# Patient Record
Sex: FEMALE | Race: WHITE | NOT HISPANIC OR LATINO | Employment: UNEMPLOYED | URBAN - METROPOLITAN AREA
[De-identification: names, ages, dates, MRNs, and addresses within clinical notes are randomized per-mention and may not be internally consistent; named-entity substitution may affect disease eponyms.]

---

## 2021-08-25 ENCOUNTER — OFFICE VISIT (OUTPATIENT)
Dept: FAMILY MEDICINE CLINIC | Facility: CLINIC | Age: 21
End: 2021-08-25

## 2021-08-25 ENCOUNTER — LAB (OUTPATIENT)
Dept: LAB | Facility: HOSPITAL | Age: 21
End: 2021-08-25

## 2021-08-25 VITALS
RESPIRATION RATE: 18 BRPM | BODY MASS INDEX: 33.17 KG/M2 | SYSTOLIC BLOOD PRESSURE: 121 MMHG | HEART RATE: 79 BPM | OXYGEN SATURATION: 97 % | DIASTOLIC BLOOD PRESSURE: 69 MMHG | HEIGHT: 63 IN | WEIGHT: 187.2 LBS

## 2021-08-25 DIAGNOSIS — R19.8 ALTERNATING CONSTIPATION AND DIARRHEA: ICD-10-CM

## 2021-08-25 DIAGNOSIS — E66.09 CLASS 1 OBESITY DUE TO EXCESS CALORIES WITHOUT SERIOUS COMORBIDITY WITH BODY MASS INDEX (BMI) OF 33.0 TO 33.9 IN ADULT: ICD-10-CM

## 2021-08-25 DIAGNOSIS — E03.9 HYPOTHYROIDISM, UNSPECIFIED TYPE: Primary | ICD-10-CM

## 2021-08-25 DIAGNOSIS — R53.83 FATIGUE, UNSPECIFIED TYPE: ICD-10-CM

## 2021-08-25 PROBLEM — E66.811 CLASS 1 OBESITY DUE TO EXCESS CALORIES WITHOUT SERIOUS COMORBIDITY WITH BODY MASS INDEX (BMI) OF 33.0 TO 33.9 IN ADULT: Status: ACTIVE | Noted: 2021-08-25

## 2021-08-25 LAB
ALBUMIN SERPL-MCNC: 4.2 G/DL (ref 3.5–5.2)
ALBUMIN/GLOB SERPL: 1.7 G/DL
ALP SERPL-CCNC: 100 U/L (ref 39–117)
ALT SERPL W P-5'-P-CCNC: 31 U/L (ref 1–33)
ANION GAP SERPL CALCULATED.3IONS-SCNC: 9.9 MMOL/L (ref 5–15)
AST SERPL-CCNC: 27 U/L (ref 1–32)
BASOPHILS # BLD AUTO: 0.05 10*3/MM3 (ref 0–0.2)
BASOPHILS NFR BLD AUTO: 0.5 % (ref 0–1.5)
BILIRUB SERPL-MCNC: 0.2 MG/DL (ref 0–1.2)
BUN SERPL-MCNC: 17 MG/DL (ref 6–20)
BUN/CREAT SERPL: 25 (ref 7–25)
CALCIUM SPEC-SCNC: 9.7 MG/DL (ref 8.6–10.5)
CHLORIDE SERPL-SCNC: 104 MMOL/L (ref 98–107)
CO2 SERPL-SCNC: 24.1 MMOL/L (ref 22–29)
CREAT SERPL-MCNC: 0.68 MG/DL (ref 0.57–1)
DEPRECATED RDW RBC AUTO: 39.1 FL (ref 37–54)
EOSINOPHIL # BLD AUTO: 0.18 10*3/MM3 (ref 0–0.4)
EOSINOPHIL NFR BLD AUTO: 1.7 % (ref 0.3–6.2)
ERYTHROCYTE [DISTWIDTH] IN BLOOD BY AUTOMATED COUNT: 12 % (ref 12.3–15.4)
FERRITIN SERPL-MCNC: 31.7 NG/ML (ref 13–150)
FOLATE SERPL-MCNC: 5.39 NG/ML (ref 4.78–24.2)
GFR SERPL CREATININE-BSD FRML MDRD: 107 ML/MIN/1.73
GLOBULIN UR ELPH-MCNC: 2.5 GM/DL
GLUCOSE SERPL-MCNC: 75 MG/DL (ref 65–99)
HCT VFR BLD AUTO: 40.2 % (ref 34–46.6)
HGB BLD-MCNC: 13.7 G/DL (ref 12–15.9)
IMM GRANULOCYTES # BLD AUTO: 0.04 10*3/MM3 (ref 0–0.05)
IMM GRANULOCYTES NFR BLD AUTO: 0.4 % (ref 0–0.5)
IRON 24H UR-MRATE: 61 MCG/DL (ref 37–145)
IRON SATN MFR SERPL: 12 % (ref 20–50)
LYMPHOCYTES # BLD AUTO: 2.42 10*3/MM3 (ref 0.7–3.1)
LYMPHOCYTES NFR BLD AUTO: 22.6 % (ref 19.6–45.3)
MCH RBC QN AUTO: 30.9 PG (ref 26.6–33)
MCHC RBC AUTO-ENTMCNC: 34.1 G/DL (ref 31.5–35.7)
MCV RBC AUTO: 90.5 FL (ref 79–97)
MONOCYTES # BLD AUTO: 0.66 10*3/MM3 (ref 0.1–0.9)
MONOCYTES NFR BLD AUTO: 6.2 % (ref 5–12)
NEUTROPHILS NFR BLD AUTO: 68.6 % (ref 42.7–76)
NEUTROPHILS NFR BLD AUTO: 7.34 10*3/MM3 (ref 1.7–7)
NRBC BLD AUTO-RTO: 0 /100 WBC (ref 0–0.2)
PLATELET # BLD AUTO: 290 10*3/MM3 (ref 140–450)
PMV BLD AUTO: 10.3 FL (ref 6–12)
POTASSIUM SERPL-SCNC: 4.3 MMOL/L (ref 3.5–5.2)
PROT SERPL-MCNC: 6.7 G/DL (ref 6–8.5)
RBC # BLD AUTO: 4.44 10*6/MM3 (ref 3.77–5.28)
SODIUM SERPL-SCNC: 138 MMOL/L (ref 136–145)
TIBC SERPL-MCNC: 524 MCG/DL (ref 298–536)
TRANSFERRIN SERPL-MCNC: 352 MG/DL (ref 200–360)
VIT B12 BLD-MCNC: 621 PG/ML (ref 211–946)
WBC # BLD AUTO: 10.69 10*3/MM3 (ref 3.4–10.8)

## 2021-08-25 PROCEDURE — 36415 COLL VENOUS BLD VENIPUNCTURE: CPT | Performed by: NURSE PRACTITIONER

## 2021-08-25 PROCEDURE — 82607 VITAMIN B-12: CPT

## 2021-08-25 PROCEDURE — 83540 ASSAY OF IRON: CPT

## 2021-08-25 PROCEDURE — 99204 OFFICE O/P NEW MOD 45 MIN: CPT | Performed by: NURSE PRACTITIONER

## 2021-08-25 PROCEDURE — 85025 COMPLETE CBC W/AUTO DIFF WBC: CPT | Performed by: NURSE PRACTITIONER

## 2021-08-25 PROCEDURE — 80053 COMPREHEN METABOLIC PANEL: CPT

## 2021-08-25 PROCEDURE — 82746 ASSAY OF FOLIC ACID SERUM: CPT

## 2021-08-25 PROCEDURE — 82728 ASSAY OF FERRITIN: CPT

## 2021-08-25 PROCEDURE — 84466 ASSAY OF TRANSFERRIN: CPT

## 2021-08-25 RX ORDER — LUBIPROSTONE 8 UG/1
8 CAPSULE ORAL 2 TIMES DAILY WITH MEALS
Qty: 60 CAPSULE | Refills: 1 | Status: SHIPPED | OUTPATIENT
Start: 2021-08-25 | End: 2021-09-02

## 2021-08-25 RX ORDER — LEVOTHYROXINE SODIUM 0.05 MG/1
50 TABLET ORAL DAILY
COMMUNITY
Start: 2021-06-05 | End: 2021-08-25 | Stop reason: SDUPTHER

## 2021-08-25 RX ORDER — LEVOTHYROXINE SODIUM 0.05 MG/1
50 TABLET ORAL DAILY
Qty: 90 TABLET | Refills: 0 | Status: SHIPPED | OUTPATIENT
Start: 2021-08-25 | End: 2022-10-14

## 2021-08-25 NOTE — ASSESSMENT & PLAN NOTE
Patient currently well controlled, last labs a couple weeks ago which were within normal limits.  Continue current dose of levothyroxine

## 2021-08-25 NOTE — ASSESSMENT & PLAN NOTE
Discussed with patient that I recommend diet and exercise changes to address her weight.  Recommend that she start a food journal and keep it for approximately 2 to 4 weeks and calculate what her average daily calories are, then try to reduce that by 300.  Recommend she increase cardiovascular exercise as well.

## 2021-08-26 ENCOUNTER — TELEPHONE (OUTPATIENT)
Dept: FAMILY MEDICINE CLINIC | Facility: CLINIC | Age: 21
End: 2021-08-26

## 2021-09-02 DIAGNOSIS — K59.00 CONSTIPATION, UNSPECIFIED CONSTIPATION TYPE: Primary | ICD-10-CM

## 2021-10-25 ENCOUNTER — OFFICE VISIT (OUTPATIENT)
Dept: FAMILY MEDICINE CLINIC | Facility: CLINIC | Age: 21
End: 2021-10-25

## 2021-10-25 ENCOUNTER — LAB (OUTPATIENT)
Dept: LAB | Facility: HOSPITAL | Age: 21
End: 2021-10-25

## 2021-10-25 VITALS
SYSTOLIC BLOOD PRESSURE: 132 MMHG | BODY MASS INDEX: 33.88 KG/M2 | DIASTOLIC BLOOD PRESSURE: 71 MMHG | OXYGEN SATURATION: 98 % | WEIGHT: 191.2 LBS | HEART RATE: 69 BPM | HEIGHT: 63 IN

## 2021-10-25 DIAGNOSIS — E03.9 HYPOTHYROIDISM, UNSPECIFIED TYPE: Primary | ICD-10-CM

## 2021-10-25 DIAGNOSIS — L65.9 HAIR LOSS: ICD-10-CM

## 2021-10-25 DIAGNOSIS — H65.93 BILATERAL SEROUS OTITIS MEDIA, UNSPECIFIED CHRONICITY: ICD-10-CM

## 2021-10-25 DIAGNOSIS — H92.03 EAR PAIN, BILATERAL: ICD-10-CM

## 2021-10-25 LAB
T4 FREE SERPL-MCNC: 1.34 NG/DL (ref 0.93–1.7)
TSH SERPL DL<=0.05 MIU/L-ACNC: 2.03 UIU/ML (ref 0.27–4.2)

## 2021-10-25 PROCEDURE — 84439 ASSAY OF FREE THYROXINE: CPT | Performed by: NURSE PRACTITIONER

## 2021-10-25 PROCEDURE — 99214 OFFICE O/P EST MOD 30 MIN: CPT | Performed by: NURSE PRACTITIONER

## 2021-10-25 PROCEDURE — 84443 ASSAY THYROID STIM HORMONE: CPT | Performed by: NURSE PRACTITIONER

## 2021-10-25 PROCEDURE — 36415 COLL VENOUS BLD VENIPUNCTURE: CPT | Performed by: NURSE PRACTITIONER

## 2021-10-25 RX ORDER — MULTIPLE VITAMINS W/ MINERALS TAB 9MG-400MCG
1 TAB ORAL DAILY
COMMUNITY
End: 2022-10-14

## 2021-10-25 RX ORDER — METHYLPREDNISOLONE 4 MG/1
TABLET ORAL
Qty: 1 EACH | Refills: 0 | Status: SHIPPED | OUTPATIENT
Start: 2021-10-25 | End: 2022-10-14

## 2021-10-25 RX ORDER — FLUTICASONE PROPIONATE 50 MCG
2 SPRAY, SUSPENSION (ML) NASAL DAILY
COMMUNITY
Start: 2021-10-15 | End: 2022-10-14

## 2021-10-25 RX ORDER — CETIRIZINE HYDROCHLORIDE 10 MG/1
10 TABLET ORAL DAILY
COMMUNITY
Start: 2021-10-15 | End: 2022-10-14

## 2021-10-25 NOTE — PROGRESS NOTES
Chief Complaint  Hypothyroidism, ear pain    Subjective          [place] Bragg presents to [facility] for   History of Present Illness    Patient is here for a follow up on hypothyroidism.  Would like to have her levels rechecked, wonders if they are not abnormal because she feels like she is losing more hair    Patient denies needing any refills on medications.     Patient has been having ear pain in bilateral ears and ringing in them. Patient was seen at urgent care and was told she had some fluid in her ears and given a nasal spray and allergy medication, but states that it is not helping.  Has been taking meds for approximately 1 week    Medical History  Past Medical History:   Diagnosis Date   • Anemia    • Anxiety    • Depression    • Headache    • Hypothyroidism      Surgical History  Past Surgical History:   Procedure Laterality Date   • CHOLECYSTECTOMY     • COLONOSCOPY     • ENDOSCOPY     • TONSILLECTOMY     • TUBAL ABDOMINAL LIGATION       Social History  Social History     Socioeconomic History   • Marital status: Single   Tobacco Use   • Smoking status: Former Smoker   • Smokeless tobacco: Never Used   Vaping Use   • Vaping Use: Some days   • Substances: Nicotine   • Devices: Disposable   Substance and Sexual Activity   • Alcohol use: Yes   • Drug use: Never   • Sexual activity: Defer       Current Outpatient Medications:   •  cetirizine (zyrTEC) 10 MG tablet, Take 10 mg by mouth Daily., Disp: , Rfl:   •  fluticasone (FLONASE) 50 MCG/ACT nasal spray, 2 sprays into the nostril(s) as directed by provider Daily., Disp: , Rfl:   •  levothyroxine (SYNTHROID, LEVOTHROID) 50 MCG tablet, Take 1 tablet by mouth Daily., Disp: 90 tablet, Rfl: 0  •  multivitamin with minerals tablet tablet, Take 1 tablet by mouth Daily., Disp: , Rfl:   •  linaclotide (LINZESS) 290 MCG capsule capsule, Take 1 capsule by mouth Every Morning Before Breakfast., Disp: 30 capsule, Rfl: 1  •   "methylPREDNISolone (MEDROL) 4 MG dose pack, Take as directed on package instructions., Disp: 1 each, Rfl: 0    Review of Systems     Objective     /71 (BP Location: Left arm, Patient Position: Sitting, Cuff Size: Adult)   Pulse 69   Ht 160 cm (63\")   Wt 86.7 kg (191 lb 3.2 oz)   SpO2 98%   BMI 33.87 kg/m²     Body mass index is 33.87 kg/m².    Physical Exam  Vitals reviewed.   Constitutional:       Appearance: Normal appearance. She is well-developed.      Comments: Patient has a very thick hair, no obvious loss noted   HENT:      Head: Normocephalic and atraumatic.      Right Ear: Ear canal and external ear normal.      Left Ear: External ear normal.      Ears:      Comments: Trace fluid right TM, small amount of fluid on the left     Mouth/Throat:      Pharynx: No oropharyngeal exudate or posterior oropharyngeal erythema.   Eyes:      Conjunctiva/sclera: Conjunctivae normal.      Pupils: Pupils are equal, round, and reactive to light.   Cardiovascular:      Rate and Rhythm: Normal rate and regular rhythm.      Heart sounds: No murmur heard.  No friction rub. No gallop.    Pulmonary:      Effort: Pulmonary effort is normal.      Breath sounds: Normal breath sounds. No wheezing or rhonchi.   Skin:     General: Skin is warm and dry.   Neurological:      Mental Status: She is alert and oriented to person, place, and time.      Cranial Nerves: No cranial nerve deficit.   Psychiatric:         Mood and Affect: Mood and affect normal.         Behavior: Behavior normal.         Thought Content: Thought content normal.         Judgment: Judgment normal.         Result Review :     The following data was reviewed by: NI Alas on 10/25/2021:    CMP    CMP 8/25/21   Glucose 75   BUN 17   Creatinine 0.68   eGFR Non African Am 107   Sodium 138   Potassium 4.3   Chloride 104   Calcium 9.7   Albumin 4.20   Total Bilirubin 0.2   Alkaline Phosphatase 100   AST (SGOT) 27   ALT (SGPT) 31           CBC    CBC " 8/25/21   WBC 10.69   RBC 4.44   Hemoglobin 13.7   Hematocrit 40.2   MCV 90.5   MCH 30.9   MCHC 34.1   RDW 12.0 (A)   Platelets 290   (A) Abnormal value                                   Assessment:  Diagnoses and all orders for this visit:    1. Hypothyroidism, unspecified type (Primary)  -     TSH+Free T4    2. Hair loss  Comments:  We will check thyroid labs, discussed that lots of things can contribute to hair loss, recommend continue to monitor.    3. Ear pain, bilateral  -     methylPREDNISolone (MEDROL) 4 MG dose pack; Take as directed on package instructions.  Dispense: 1 each; Refill: 0    4. Bilateral serous otitis media, unspecified chronicity  -     methylPREDNISolone (MEDROL) 4 MG dose pack; Take as directed on package instructions.  Dispense: 1 each; Refill: 0        P          Follow Up     No follow-ups on file.    Patient was given instructions and counseling regarding her condition or for health maintenance advice. Please see specific information pulled into the AVS if appropriate.     Jenny Cartwright, APRN  10/25/2021

## 2021-10-27 ENCOUNTER — TELEPHONE (OUTPATIENT)
Dept: FAMILY MEDICINE CLINIC | Facility: CLINIC | Age: 21
End: 2021-10-27

## 2021-11-03 ENCOUNTER — PATIENT MESSAGE (OUTPATIENT)
Dept: FAMILY MEDICINE CLINIC | Facility: CLINIC | Age: 21
End: 2021-11-03

## 2021-11-04 NOTE — TELEPHONE ENCOUNTER
From: Carmina Guyer  To: NI Alas  Sent: 11/3/2021 4:12 PM EDT  Subject: Ear fluid    I finished up the medicine for my ears. Took the last of it Monday. I can yell a difference but my ears are still hurting.

## 2021-12-07 ENCOUNTER — OFFICE VISIT (OUTPATIENT)
Dept: GASTROENTEROLOGY | Facility: CLINIC | Age: 21
End: 2021-12-07

## 2021-12-07 VITALS
WEIGHT: 187.83 LBS | HEIGHT: 63 IN | HEART RATE: 66 BPM | DIASTOLIC BLOOD PRESSURE: 68 MMHG | SYSTOLIC BLOOD PRESSURE: 139 MMHG | BODY MASS INDEX: 33.28 KG/M2

## 2021-12-07 DIAGNOSIS — D50.9 IRON DEFICIENCY ANEMIA, UNSPECIFIED IRON DEFICIENCY ANEMIA TYPE: Primary | ICD-10-CM

## 2021-12-07 DIAGNOSIS — R19.7 DIARRHEA, UNSPECIFIED TYPE: ICD-10-CM

## 2021-12-07 DIAGNOSIS — R14.0 ABDOMINAL BLOATING: ICD-10-CM

## 2021-12-07 DIAGNOSIS — K59.00 CONSTIPATION, UNSPECIFIED CONSTIPATION TYPE: ICD-10-CM

## 2021-12-07 PROCEDURE — 99214 OFFICE O/P EST MOD 30 MIN: CPT | Performed by: NURSE PRACTITIONER

## 2021-12-07 RX ORDER — SOD SULF/POT CHLORIDE/MAG SULF 1.479 G
12 TABLET ORAL TAKE AS DIRECTED
Qty: 24 TABLET | Refills: 0 | Status: ON HOLD | OUTPATIENT
Start: 2021-12-07 | End: 2022-02-22

## 2021-12-07 RX ORDER — L.ACIDOPH/B.ANIMALIS/B.LONGUM 15B CELL
1 CAPSULE ORAL DAILY
Qty: 30 CAPSULE | Refills: 0 | Status: SHIPPED | OUTPATIENT
Start: 2021-12-07 | End: 2021-12-08

## 2021-12-07 NOTE — PROGRESS NOTES
Patient Name: Carmina Bragg   Visit Date: 12/07/2021   Patient ID: [id number]  Provider: NI Tejada    Sex: female  Location:  Location Address:  Location Phone: [address] [place] [address] [phone]    YOB: 2000      Primary Care Provider Jenny Cartwright APRN      Referring Provider: No ref. provider found        Chief Complaint  Constipation (Alternating), Diarrhea (Alternating ), and GI Problem (Gas and Bloating )    History of Present Illness  Carmina Bragg is a 21 y.o. who presents to [facility] on referral from No ref. provider found for a gastroenterology evaluation of Constipation (Alternating), Diarrhea (Alternating ), and GI Problem (Gas and Bloating ).    Ms. Bragg reports a change in bowel movements over the last 3 months.  Stools alternating from constipation to diarrhea.  Reports it is a 50-50 toss up what she will have from each day.  She has tried using Imodium during diarrhea episodes and MiraLAX as constipation however she cannot find a happy medium. Denies any heamtocheiza, melena, or abdominal pain.    Severe abdominal bloating that does not correlate to anything specific. Denies any heartburn, nausea, vomiting, or dysphiga. Appetite and weight stable.     Patient is also iron deficient and has been taking oral iron for the last few months.  She denies a heavy menstrual cycle.  No previous scopes.    Labs Result Review Imaging    Past Medical History:   Diagnosis Date   • Anemia    • Anxiety    • Depression    • Headache    • Hypothyroidism        Past Surgical History:   Procedure Laterality Date   • CHOLECYSTECTOMY     • COLONOSCOPY     • ENDOSCOPY     • TONSILLECTOMY     • TUBAL ABDOMINAL LIGATION     • UPPER GASTROINTESTINAL ENDOSCOPY           Current Outpatient Medications:   •  levothyroxine (SYNTHROID, LEVOTHROID) 50 MCG tablet, Take 1 tablet by mouth Daily., Disp: 90 tablet, Rfl: 0  •  cetirizine  "(zyrTEC) 10 MG tablet, Take 10 mg by mouth Daily., Disp: , Rfl:   •  fluticasone (FLONASE) 50 MCG/ACT nasal spray, 2 sprays into the nostril(s) as directed by provider Daily., Disp: , Rfl:   •  linaclotide (LINZESS) 290 MCG capsule capsule, Take 1 capsule by mouth Every Morning Before Breakfast., Disp: 30 capsule, Rfl: 1  •  methylPREDNISolone (MEDROL) 4 MG dose pack, Take as directed on package instructions., Disp: 1 each, Rfl: 0  •  multivitamin with minerals tablet tablet, Take 1 tablet by mouth Daily., Disp: , Rfl:   •  Probiotic Product (Florajen Digestion) capsule, Take 1 capsule by mouth Daily for 30 days., Disp: 30 capsule, Rfl: 0  •  Sodium Sulfate-Mag Sulfate-KCl (Sutab) 4401-103-488 MG tablet, Take 12 tablets by mouth Take As Directed. Take 12 tablets at 6 pm and 12 tablets 4 hours prior to procedure., Disp: 24 tablet, Rfl: 0     No Known Allergies    Family History   Problem Relation Age of Onset   • Stroke Mother    • Diabetes Mother    • Hypertension Mother    • Irritable bowel syndrome Mother    • Crohn's disease Maternal Grandmother         Social History     Social History Narrative   • Not on file         Objective     Review of Systems   Gastrointestinal: Positive for abdominal distention, constipation and diarrhea.        Vital Signs:   /68   Pulse 66   Ht 160 cm (63\")   Wt 85.2 kg (187 lb 13.3 oz)   BMI 33.27 kg/m²       Physical Exam  Constitutional:       General: She is not in acute distress.     Appearance: Normal appearance. She is well-developed and normal weight.   HENT:      Head: Normocephalic and atraumatic.   Eyes:      Conjunctiva/sclera: Conjunctivae normal.      Pupils: Pupils are equal, round, and reactive to light.      Visual Fields: Right eye visual fields normal and left eye visual fields normal.   Cardiovascular:      Rate and Rhythm: Normal rate and regular rhythm.      Heart sounds: Normal heart sounds.   Pulmonary:      Effort: Pulmonary effort is normal. No " retractions.      Breath sounds: Normal breath sounds and air entry.   Abdominal:      General: Bowel sounds are normal.      Palpations: Abdomen is soft.      Tenderness: There is no abdominal tenderness.      Comments: No appreciable hepatosplenomegaly or ascites   Musculoskeletal:         General: Normal range of motion.      Cervical back: Neck supple.      Right lower leg: No edema.      Left lower leg: No edema.   Lymphadenopathy:      Cervical: No cervical adenopathy.   Skin:     General: Skin is warm and dry.      Findings: No lesion.   Neurological:      General: No focal deficit present.      Mental Status: She is alert and oriented to person, place, and time.   Psychiatric:         Mood and Affect: Mood and affect normal.         Behavior: Behavior normal.         Result Review :   The following data was reviewed by: NI Tejada on 12/07/2021:  CMP    CMP 8/25/21   Glucose 75   BUN 17   Creatinine 0.68   eGFR Non African Am 107   Sodium 138   Potassium 4.3   Chloride 104   Calcium 9.7   Albumin 4.20   Total Bilirubin 0.2   Alkaline Phosphatase 100   AST (SGOT) 27   ALT (SGPT) 31           CBC w/diff    CBC w/Diff 8/25/21   WBC 10.69   RBC 4.44   Hemoglobin 13.7   Hematocrit 40.2   MCV 90.5   MCH 30.9   MCHC 34.1   RDW 12.0 (A)   Platelets 290   Neutrophil Rel % 68.6   Immature Granulocyte Rel % 0.4   Lymphocyte Rel % 22.6   Monocyte Rel % 6.2   Eosinophil Rel % 1.7   Basophil Rel % 0.5   (A) Abnormal value            Lab Results   Component Value Date    IRON 61 08/25/2021    TIBC 524 08/25/2021    FERRITIN 31.70 08/25/2021    LABIRON 12 (L) 08/25/2021              Assessment and Plan    Diagnoses and all orders for this visit:    1. Iron deficiency anemia, unspecified iron deficiency anemia type (Primary)  -     Case Request; Standing  -     COVID PRE-OP / PRE-PROCEDURE SCREENING ORDER (NO ISOLATION) - Swab, Nasopharynx; Future  -     Case Request  -     US Abdomen Complete; Future    2.  Abdominal bloating  -     Case Request; Standing  -     COVID PRE-OP / PRE-PROCEDURE SCREENING ORDER (NO ISOLATION) - Swab, Nasopharynx; Future  -     Case Request  -     US Abdomen Complete; Future    3. Constipation, unspecified constipation type  -     Case Request; Standing  -     COVID PRE-OP / PRE-PROCEDURE SCREENING ORDER (NO ISOLATION) - Swab, Nasopharynx; Future  -     Case Request  -     US Abdomen Complete; Future    4. Diarrhea, unspecified type  -     Case Request; Standing  -     COVID PRE-OP / PRE-PROCEDURE SCREENING ORDER (NO ISOLATION) - Swab, Nasopharynx; Future  -     Case Request  -     US Abdomen Complete; Future    Other orders  -     Follow Anesthesia Guidelines / Protocol; Future  -     Obtain Informed Consent; Future  -     Sodium Sulfate-Mag Sulfate-KCl (Sutab) 9891-164-735 MG tablet; Take 12 tablets by mouth Take As Directed. Take 12 tablets at 6 pm and 12 tablets 4 hours prior to procedure.  Dispense: 24 tablet; Refill: 0  -     Probiotic Product (Florajen Digestion) capsule; Take 1 capsule by mouth Daily for 30 days.  Dispense: 30 capsule; Refill: 0      ESOPHAGOGASTRODUODENOSCOPY (N/A), COLONOSCOPY (N/A)         Follow Up   Return for Follow up after procedure.  Patient was given instructions and counseling regarding her condition or for health maintenance advice. Please see specific information pulled into the AVS if appropriate.

## 2021-12-08 RX ORDER — L.ACIDOPH/B.ANIMALIS/B.LONGUM 15B CELL
1 CAPSULE ORAL DAILY
Qty: 30 CAPSULE | Refills: 0 | Status: SHIPPED | OUTPATIENT
Start: 2021-12-08 | End: 2022-01-07

## 2022-01-07 ENCOUNTER — APPOINTMENT (OUTPATIENT)
Dept: ULTRASOUND IMAGING | Facility: HOSPITAL | Age: 22
End: 2022-01-07

## 2022-01-24 ENCOUNTER — TELEPHONE (OUTPATIENT)
Dept: GASTROENTEROLOGY | Facility: CLINIC | Age: 22
End: 2022-01-24

## 2022-01-24 NOTE — TELEPHONE ENCOUNTER
Spoke to patient regarding overdue results. She had to cancel this due to bad weather but it has now been rescheduled to 2/23/22 at 1:30.

## 2022-02-15 ENCOUNTER — TELEPHONE (OUTPATIENT)
Dept: GASTROENTEROLOGY | Facility: CLINIC | Age: 22
End: 2022-02-15

## 2022-02-15 NOTE — TELEPHONE ENCOUNTER
I have called and spoke to patient with an upcoming procedure reminder. Patient confirmed.   Patient requests information being sent to [employer].

## 2022-02-17 ENCOUNTER — LAB (OUTPATIENT)
Dept: LAB | Facility: HOSPITAL | Age: 22
End: 2022-02-17

## 2022-02-17 ENCOUNTER — TELEPHONE (OUTPATIENT)
Dept: FAMILY MEDICINE CLINIC | Facility: CLINIC | Age: 22
End: 2022-02-17

## 2022-02-17 DIAGNOSIS — R14.0 ABDOMINAL BLOATING: ICD-10-CM

## 2022-02-17 DIAGNOSIS — R19.7 DIARRHEA, UNSPECIFIED TYPE: ICD-10-CM

## 2022-02-17 DIAGNOSIS — D50.9 IRON DEFICIENCY ANEMIA, UNSPECIFIED IRON DEFICIENCY ANEMIA TYPE: ICD-10-CM

## 2022-02-17 DIAGNOSIS — K59.00 CONSTIPATION, UNSPECIFIED CONSTIPATION TYPE: ICD-10-CM

## 2022-02-17 LAB — SARS-COV-2 RNA RESP QL NAA+PROBE: NOT DETECTED

## 2022-02-17 PROCEDURE — C9803 HOPD COVID-19 SPEC COLLECT: HCPCS

## 2022-02-17 PROCEDURE — U0003 INFECTIOUS AGENT DETECTION BY NUCLEIC ACID (DNA OR RNA); SEVERE ACUTE RESPIRATORY SYNDROME CORONAVIRUS 2 (SARS-COV-2) (CORONAVIRUS DISEASE [COVID-19]), AMPLIFIED PROBE TECHNIQUE, MAKING USE OF HIGH THROUGHPUT TECHNOLOGIES AS DESCRIBED BY CMS-2020-01-R: HCPCS

## 2022-02-17 NOTE — TELEPHONE ENCOUNTER
Caller: Carmina Bragg    Relationship: Self    Best call back number: [phone]    What form or medical record are you requesting: THE PAPERWORK THAT GIVES HER THE INFORMATION OF THE THINGS SHE NEEDS TO DO BEFORE HER PROCEDURE?    Who is requesting this form or medical record from you: SELF    How would you like to receive the form or medical records (pick-up, mail, fax):   If pick-up, provide patient with address and location details    Timeframe paperwork needed: TODAY    Additional notes: PATIENT STATES SHE IS COMING TO GET THIS LATER THIS AFTERNOON. ADVISED IT MAY NOT BE READY BUT SHE INSISTED

## 2022-02-21 RX ORDER — LEVOTHYROXINE SODIUM 0.05 MG/1
50 TABLET ORAL DAILY
COMMUNITY
End: 2022-10-14

## 2022-02-22 ENCOUNTER — HOSPITAL ENCOUNTER (OUTPATIENT)
Facility: HOSPITAL | Age: 22
Setting detail: HOSPITAL OUTPATIENT SURGERY
Discharge: HOME | End: 2022-02-22
Attending: INTERNAL MEDICINE | Admitting: INTERNAL MEDICINE

## 2022-02-22 ENCOUNTER — ANESTHESIA EVENT (OUTPATIENT)
Dept: GASTROENTEROLOGY | Facility: HOSPITAL | Age: 22
End: 2022-02-22

## 2022-02-22 ENCOUNTER — ANESTHESIA (OUTPATIENT)
Dept: GASTROENTEROLOGY | Facility: HOSPITAL | Age: 22
End: 2022-02-22

## 2022-02-22 VITALS
OXYGEN SATURATION: 98 % | SYSTOLIC BLOOD PRESSURE: 91 MMHG | HEART RATE: 67 BPM | DIASTOLIC BLOOD PRESSURE: 59 MMHG | TEMPERATURE: 97 F | HEIGHT: 63 IN | BODY MASS INDEX: 32.58 KG/M2 | RESPIRATION RATE: 15 BRPM | WEIGHT: 183.86 LBS

## 2022-02-22 DIAGNOSIS — R19.7 DIARRHEA, UNSPECIFIED TYPE: ICD-10-CM

## 2022-02-22 DIAGNOSIS — K59.00 CONSTIPATION, UNSPECIFIED CONSTIPATION TYPE: ICD-10-CM

## 2022-02-22 DIAGNOSIS — R14.0 ABDOMINAL BLOATING: ICD-10-CM

## 2022-02-22 DIAGNOSIS — D50.9 IRON DEFICIENCY ANEMIA, UNSPECIFIED IRON DEFICIENCY ANEMIA TYPE: ICD-10-CM

## 2022-02-22 PROCEDURE — 43239 EGD BIOPSY SINGLE/MULTIPLE: CPT | Performed by: INTERNAL MEDICINE

## 2022-02-22 PROCEDURE — 25010000002 PROPOFOL 10 MG/ML EMULSION: Performed by: NURSE ANESTHETIST, CERTIFIED REGISTERED

## 2022-02-22 PROCEDURE — 88305 TISSUE EXAM BY PATHOLOGIST: CPT | Performed by: INTERNAL MEDICINE

## 2022-02-22 PROCEDURE — 45380 COLONOSCOPY AND BIOPSY: CPT | Performed by: INTERNAL MEDICINE

## 2022-02-22 RX ORDER — SODIUM CHLORIDE, SODIUM LACTATE, POTASSIUM CHLORIDE, CALCIUM CHLORIDE 600; 310; 30; 20 MG/100ML; MG/100ML; MG/100ML; MG/100ML
INJECTION, SOLUTION INTRAVENOUS CONTINUOUS PRN
Status: DISCONTINUED | OUTPATIENT
Start: 2022-02-22 | End: 2022-02-22 | Stop reason: SURG

## 2022-02-22 RX ORDER — SODIUM CHLORIDE, SODIUM LACTATE, POTASSIUM CHLORIDE, CALCIUM CHLORIDE 600; 310; 30; 20 MG/100ML; MG/100ML; MG/100ML; MG/100ML
30 INJECTION, SOLUTION INTRAVENOUS CONTINUOUS
Status: DISCONTINUED | OUTPATIENT
Start: 2022-02-22 | End: 2022-02-22 | Stop reason: HOSPADM

## 2022-02-22 RX ORDER — LIDOCAINE HYDROCHLORIDE 20 MG/ML
INJECTION, SOLUTION INFILTRATION; PERINEURAL AS NEEDED
Status: DISCONTINUED | OUTPATIENT
Start: 2022-02-22 | End: 2022-02-22 | Stop reason: SURG

## 2022-02-22 RX ADMIN — LIDOCAINE HYDROCHLORIDE 100 MG: 20 INJECTION, SOLUTION INFILTRATION; PERINEURAL at 09:51

## 2022-02-22 RX ADMIN — PROPOFOL 199 MCG/KG/MIN: 10 INJECTION, EMULSION INTRAVENOUS at 09:51

## 2022-02-22 RX ADMIN — SODIUM CHLORIDE, POTASSIUM CHLORIDE, SODIUM LACTATE AND CALCIUM CHLORIDE: 600; 310; 30; 20 INJECTION, SOLUTION INTRAVENOUS at 09:52

## 2022-02-22 NOTE — ANESTHESIA PREPROCEDURE EVALUATION
Anesthesia Evaluation     Patient summary reviewed and Nursing notes reviewed                Airway   Mallampati: I  TM distance: >3 FB  Neck ROM: full  No difficulty expected  Dental      Pulmonary - negative pulmonary ROS and normal exam    breath sounds clear to auscultation  Cardiovascular - negative cardio ROS and normal exam    Rhythm: regular  Rate: normal        Neuro/Psych  (+) headaches, psychiatric history,    GI/Hepatic/Renal/Endo    (+) obesity,   thyroid problem hypothyroidism    Musculoskeletal (-) negative ROS    Abdominal    Substance History - negative use     OB/GYN negative ob/gyn ROS         Other                        Anesthesia Plan    ASA 1     general     intravenous induction     Anesthetic plan, all risks, benefits, and alternatives have been provided, discussed and informed consent has been obtained with: patient.        CODE STATUS:

## 2022-02-22 NOTE — ANESTHESIA POSTPROCEDURE EVALUATION
Patient: Carmina Bragg    Procedure Summary     Date: 02/22/22 Room / Location: [facility] ENDOSCOPY 1 / [facility] ENDOSCOPY    Anesthesia Start: 0949 Anesthesia Stop: 1022    Procedures:       ESOPHAGOGASTRODUODENOSCOPY with biopsies (N/A )      COLONOSCOPY with random biopsies (N/A ) Diagnosis:       Iron deficiency anemia, unspecified iron deficiency anemia type      Abdominal bloating      Constipation, unspecified constipation type      Diarrhea, unspecified type      (Iron deficiency anemia, unspecified iron deficiency anemia type [D50.9])      (Abdominal bloating [R14.0])      (Constipation, unspecified constipation type [K59.00])      (Diarrhea, unspecified type [R19.7])    Surgeons: Allegra Capellan MD Provider: Chavez Zhang MD    Anesthesia Type: general ASA Status: 1          Anesthesia Type: general    Vitals  Vitals Value Taken Time   BP 91/59 02/22/22 1040   Temp 36.1 °C (97 °F) 02/22/22 1040   Pulse 67 02/22/22 1040   Resp 15 02/22/22 1040   SpO2 98 % 02/22/22 1040           Post Anesthesia Care and Evaluation    Patient location during evaluation: bedside  Patient participation: complete - patient participated  Level of consciousness: awake  Pain management: adequate  Airway patency: patent  Anesthetic complications: No anesthetic complications  PONV Status: none  Cardiovascular status: acceptable  Respiratory status: acceptable  Hydration status: acceptable    Comments: An Anesthesiologist personally participated in the most demanding procedures (including induction and emergence if applicable) in the anesthesia plan, monitored the course of anesthesia administration at frequent intervals and remained physically present and available for immediate diagnosis and treatment of emergencies.

## 2022-02-22 NOTE — H&P
Pre Procedure History & Physical    Chief Complaint:   Bloating, constipation, diarrhea, iron deficiency anemia    Subjective     HPI:   20 yo F here for eval of bloating, constipation, diarrhea, iron deficiency anemia.    Past Medical History:   Past Medical History:   Diagnosis Date   • Anemia    • Anxiety    • Depression    • Headache    • Hypothyroidism        Past Surgical History:  Past Surgical History:   Procedure Laterality Date   • CHOLECYSTECTOMY     • COLONOSCOPY     • ENDOSCOPY     • HYSTEROSCOPY     • TONSILLECTOMY     • TUBAL ABDOMINAL LIGATION     • UPPER GASTROINTESTINAL ENDOSCOPY         Family History:  Family History   Problem Relation Age of Onset   • Stroke Mother    • Diabetes Mother    • Hypertension Mother    • Irritable bowel syndrome Mother    • Crohn's disease Maternal Grandmother        Social History:   reports that she quit smoking about 2 years ago. Her smoking use included cigarettes. She has a 0.13 pack-year smoking history. She has never used smokeless tobacco. She reports previous alcohol use. She reports that she does not use drugs.    Medications:   Medications Prior to Admission   Medication Sig Dispense Refill Last Dose   • levothyroxine (Euthyrox) 50 MCG tablet Take 50 mcg by mouth Daily.   2/21/2022 at Unknown time   • multivitamin with minerals tablet tablet Take 1 tablet by mouth Daily.   2/21/2022 at Unknown time   • cetirizine (zyrTEC) 10 MG tablet Take 10 mg by mouth Daily.      • fluticasone (FLONASE) 50 MCG/ACT nasal spray 2 sprays into the nostril(s) as directed by provider Daily.      • levothyroxine (SYNTHROID, LEVOTHROID) 50 MCG tablet Take 1 tablet by mouth Daily. 90 tablet 0    • linaclotide (LINZESS) 290 MCG capsule capsule Take 1 capsule by mouth Every Morning Before Breakfast. 30 capsule 1    • methylPREDNISolone (MEDROL) 4 MG dose pack Take as directed on package instructions. 1 each 0        Allergies:  Patient has no known allergies.    ROS:    Pertinent  "items are noted in HPI     Objective     Blood pressure 111/76, pulse 76, temperature 97.8 °F (36.6 °C), temperature source Temporal, resp. rate 16, height 160 cm (63\"), weight 83.4 kg (183 lb 13.8 oz), SpO2 98 %.    Physical Exam   Constitutional: Pt is oriented to person, place, and time and well-developed, well-nourished, and in no distress.   Mouth/Throat: Oropharynx is clear and moist.   Neck: Normal range of motion.   Cardiovascular: Normal rate, regular rhythm and normal heart sounds.    Pulmonary/Chest: Effort normal and breath sounds normal.   Abdominal: Soft. Nontender  Skin: Skin is warm and dry.   Psychiatric: Mood, memory, affect and judgment normal.     Assessment/Plan     Diagnosis:  Bloating, constipation, diarrhea, iron deficiency anemia    Anticipated Surgical Procedure:  EGD/colonoscopy    The risks, benefits, and alternatives of this procedure have been discussed with the patient or the responsible party- the patient understands and agrees to proceed.          "

## 2022-02-23 ENCOUNTER — APPOINTMENT (OUTPATIENT)
Dept: ULTRASOUND IMAGING | Facility: HOSPITAL | Age: 22
End: 2022-02-23

## 2022-02-23 LAB
CYTO UR: NORMAL
LAB AP CASE REPORT: NORMAL
LAB AP CLINICAL INFORMATION: NORMAL
PATH REPORT.FINAL DX SPEC: NORMAL
PATH REPORT.GROSS SPEC: NORMAL

## 2022-02-24 ENCOUNTER — TELEPHONE (OUTPATIENT)
Dept: GASTROENTEROLOGY | Facility: CLINIC | Age: 22
End: 2022-02-24

## 2022-02-24 NOTE — TELEPHONE ENCOUNTER
----- Message from NI Tejada sent at 2/24/2022  1:38 PM EST -----  Random colon biopsies normal.  Stomach biopsy consistent with mild chronic gastritis.  Continue PPI and avoid NSAIDs.  Schedule patient for follow-up

## 2022-02-24 NOTE — TELEPHONE ENCOUNTER
Spoke to pt and informed of Fernandez DAN note. F/U scheduled on 06/07/2022 at 1415. Apt reminder mailed to pt. Pt verbalized understanding.     Pt is not currently on a PPI.  Please advise.

## 2022-02-28 RX ORDER — PANTOPRAZOLE SODIUM 20 MG/1
20 TABLET, DELAYED RELEASE ORAL DAILY
Qty: 30 TABLET | Refills: 3 | Status: SHIPPED | OUTPATIENT
Start: 2022-02-28 | End: 2022-06-01 | Stop reason: SDUPTHER

## 2022-03-02 ENCOUNTER — TELEPHONE (OUTPATIENT)
Dept: GASTROENTEROLOGY | Facility: CLINIC | Age: 22
End: 2022-03-02

## 2022-03-02 NOTE — TELEPHONE ENCOUNTER
I spoke to MsSteven Yemi who states that she is having trouble getting to [facility] and would like this rescheduled at [place]. It has been scheduled on 3/23/22 at 8:00. Patient notified.

## 2022-03-02 NOTE — TELEPHONE ENCOUNTER
[facility] has called the office with questions in regards to the order. Called scheduling back and left a message with the office number to return call with questions.

## 2022-06-01 RX ORDER — PANTOPRAZOLE SODIUM 20 MG/1
20 TABLET, DELAYED RELEASE ORAL DAILY
Qty: 30 TABLET | Refills: 1 | Status: SHIPPED | OUTPATIENT
Start: 2022-06-01 | End: 2022-10-14

## 2022-06-07 ENCOUNTER — OFFICE VISIT (OUTPATIENT)
Dept: GASTROENTEROLOGY | Facility: CLINIC | Age: 22
End: 2022-06-07

## 2022-06-07 VITALS
HEIGHT: 63 IN | HEART RATE: 67 BPM | BODY MASS INDEX: 31.75 KG/M2 | WEIGHT: 179.2 LBS | SYSTOLIC BLOOD PRESSURE: 128 MMHG | DIASTOLIC BLOOD PRESSURE: 68 MMHG

## 2022-06-07 DIAGNOSIS — R68.81 EARLY SATIETY: ICD-10-CM

## 2022-06-07 DIAGNOSIS — R11.2 NAUSEA AND VOMITING, UNSPECIFIED VOMITING TYPE: Primary | ICD-10-CM

## 2022-06-07 DIAGNOSIS — K44.9 HIATAL HERNIA: ICD-10-CM

## 2022-06-07 DIAGNOSIS — R63.0 DECREASED APPETITE: ICD-10-CM

## 2022-06-07 DIAGNOSIS — R42 DIZZINESS: ICD-10-CM

## 2022-06-07 DIAGNOSIS — K29.50 CHRONIC GASTRITIS WITHOUT BLEEDING, UNSPECIFIED GASTRITIS TYPE: ICD-10-CM

## 2022-06-07 DIAGNOSIS — K58.1 IRRITABLE BOWEL SYNDROME WITH CONSTIPATION: ICD-10-CM

## 2022-06-07 PROCEDURE — 99214 OFFICE O/P EST MOD 30 MIN: CPT | Performed by: NURSE PRACTITIONER

## 2022-06-07 RX ORDER — ESCITALOPRAM OXALATE 10 MG/1
TABLET ORAL
COMMUNITY
Start: 2022-06-06 | End: 2022-10-14

## 2022-06-07 NOTE — PROGRESS NOTES
Chief Complaint  Follow-up (Anemia )    History of Present Illness  Carmina Bragg is a 22 y.o. who presents to [facility] for follow up of Follow-up (Anemia )     Ms. Bragg presents today for f/u egd/colonoscopy.  Colonoscopy was unremarkable and an EGD was consistent with hiatal hernia and gastritis.  Patient reports she has had several episodes of dizziness that led to nausea and vomiting.  She does have a history of migraines.  Has been seen in the ER on 2 occasions due to the symptoms.  Reports she is always treated with Phenergan which she does find relief from.  Denies any heartburn.  Expresses that she does not get hungry often as she has to remind herself to eat.  Early satiety present.  Abdominal bloating has decreased since drinking peppermint tea. Did not notice any change  with probiotic.     Having a bowel movement every few days and having to drink herbal tea and use a stool softener in order to do so. Generalized abdominal pain waxes and wanes. No aggravating or alleviating factors.  Denies any hematochezia or melena.     Labs Result Review Imaging    Past Medical History:   Diagnosis Date   • Anemia    • Anxiety    • Depression    • Headache    • Hypothyroidism        Past Surgical History:   Procedure Laterality Date   • CHOLECYSTECTOMY     • COLONOSCOPY     • COLONOSCOPY N/A 2/22/2022    Procedure: COLONOSCOPY with random biopsies;  Surgeon: Allegra Capellan MD;  Location: formerly [facility] ENDOSCOPY;  Service: Gastroenterology;  Laterality: N/A;  normal   • ENDOSCOPY     • ENDOSCOPY N/A 2/22/2022    Procedure: ESOPHAGOGASTRODUODENOSCOPY with biopsies;  Surgeon: Allegra Capellan MD;  Location: formerly [facility] ENDOSCOPY;  Service: Gastroenterology;  Laterality: N/A;  gastritis and HH   • HYSTEROSCOPY     • TONSILLECTOMY     • TUBAL ABDOMINAL LIGATION     • UPPER GASTROINTESTINAL ENDOSCOPY         Current Outpatient Medications on File Prior to Visit   Medication Sig  "Dispense Refill   • escitalopram (LEXAPRO) 10 MG tablet      • levothyroxine (SYNTHROID, LEVOTHROID) 50 MCG tablet Take 50 mcg by mouth Daily.     • multivitamin with minerals tablet tablet Take 1 tablet by mouth Daily.     • pantoprazole (PROTONIX) 20 MG EC tablet Take 1 tablet by mouth Daily. 30 tablet 1   • cetirizine (zyrTEC) 10 MG tablet Take 10 mg by mouth Daily.     • fluticasone (FLONASE) 50 MCG/ACT nasal spray 2 sprays into the nostril(s) as directed by provider Daily.     • levothyroxine (SYNTHROID, LEVOTHROID) 50 MCG tablet Take 1 tablet by mouth Daily. 90 tablet 0   • methylPREDNISolone (MEDROL) 4 MG dose pack Take as directed on package instructions. 1 each 0   • [DISCONTINUED] linaclotide (LINZESS) 290 MCG capsule capsule Take 1 capsule by mouth Every Morning Before Breakfast. 30 capsule 1     No current facility-administered medications on file prior to visit.       Social History     Social History Narrative   • Not on file         Objective     Review of Systems   Constitutional: Positive for appetite change.   Gastrointestinal: Positive for constipation, nausea and vomiting.        Vital Signs:   /68 (BP Location: Left arm, Patient Position: Sitting, Cuff Size: Small Adult)   Pulse 67   Ht 160 cm (63\")   Wt 81.3 kg (179 lb 3.2 oz)   BMI 31.74 kg/m²       Physical Exam  Constitutional:       General: She is not in acute distress.     Appearance: Normal appearance. She is well-developed. She is obese.   HENT:      Head: Normocephalic and atraumatic.   Eyes:      Conjunctiva/sclera: Conjunctivae normal.      Pupils: Pupils are equal, round, and reactive to light.      Visual Fields: Right eye visual fields normal and left eye visual fields normal.   Cardiovascular:      Rate and Rhythm: Normal rate and regular rhythm.      Heart sounds: Normal heart sounds.   Pulmonary:      Effort: Pulmonary effort is normal. No retractions.      Breath sounds: Normal breath sounds and air entry. "   Abdominal:      General: Bowel sounds are normal. There is no distension.      Palpations: Abdomen is soft.      Tenderness: There is no abdominal tenderness.      Comments: No appreciable hepatosplenomegaly or ascites   Musculoskeletal:         General: Normal range of motion.      Cervical back: Normal range of motion and neck supple.   Skin:     General: Skin is warm and dry.   Neurological:      Mental Status: She is alert and oriented to person, place, and time.   Psychiatric:         Mood and Affect: Mood and affect normal.         Behavior: Behavior normal.         Result Review :   The following data was reviewed by: NI Tejada on 06/07/2022:  CBC w/diff    CBC w/Diff 8/25/21   WBC 10.69   RBC 4.44   Hemoglobin 13.7   Hematocrit 40.2   MCV 90.5   MCH 30.9   MCHC 34.1   RDW 12.0 (A)   Platelets 290   Neutrophil Rel % 68.6   Immature Granulocyte Rel % 0.4   Lymphocyte Rel % 22.6   Monocyte Rel % 6.2   Eosinophil Rel % 1.7   Basophil Rel % 0.5   (A) Abnormal value            CMP    CMP 8/25/21   Glucose 75   BUN 17   Creatinine 0.68   eGFR Non African Am 107   Sodium 138   Potassium 4.3   Chloride 104   Calcium 9.7   Albumin 4.20   Total Bilirubin 0.2   Alkaline Phosphatase 100   AST (SGOT) 27   ALT (SGPT) 31           Iron   Date Value Ref Range Status   08/25/2021 61 37 - 145 mcg/dL Final     TIBC   Date Value Ref Range Status   08/25/2021 524 298 - 536 mcg/dL Final     Iron Saturation   Date Value Ref Range Status   08/25/2021 12 (L) 20 - 50 % Final     Transferrin   Date Value Ref Range Status   08/25/2021 352 200 - 360 mg/dL Final     Ferritin   Date Value Ref Range Status   08/25/2021 31.70 13.00 - 150.00 ng/mL Final        EGD 2/22/2022: Small hiatal hernia.  Erythematous mucosa in the stomach.  Colonoscopy 2/22/2022: Normal colonoscopy.  Random colon biopsy-no significant pathologic change.  Duodenal biopsy-no significant pathologic change.  Antral biopsy-mild chronic inactive  gastritis.     Assessment and Plan    Diagnoses and all orders for this visit:    1. Nausea and vomiting, unspecified vomiting type (Primary)  -     NM Gastric Emptying; Future    2. Dizziness    3. Early satiety  -     NM Gastric Emptying; Future    4. Decreased appetite  -     NM Gastric Emptying; Future    5. Irritable bowel syndrome with constipation    6. Hiatal hernia    7. Chronic gastritis without bleeding, unspecified gastritis type    Other orders  -     linaclotide (Linzess) 72 MCG capsule capsule; Take 1 capsule by mouth Every Morning Before Breakfast for 90 days.  Dispense: 90 capsule; Refill: 1      * Surgery not found *       Follow Up   Return in about 4 months (around 10/7/2022).  Patient was given instructions and counseling regarding her condition or for health maintenance advice. Please see specific information pulled into the AVS if appropriate.

## 2022-07-11 ENCOUNTER — APPOINTMENT (OUTPATIENT)
Dept: NUCLEAR MEDICINE | Facility: HOSPITAL | Age: 22
End: 2022-07-11

## 2022-09-21 ENCOUNTER — INITIAL PRENATAL (OUTPATIENT)
Dept: OBSTETRICS AND GYNECOLOGY | Facility: CLINIC | Age: 22
End: 2022-09-21

## 2022-09-21 VITALS — WEIGHT: 185.4 LBS | SYSTOLIC BLOOD PRESSURE: 124 MMHG | DIASTOLIC BLOOD PRESSURE: 67 MMHG | BODY MASS INDEX: 32.84 KG/M2

## 2022-09-21 DIAGNOSIS — Z34.80 SUPERVISION OF OTHER NORMAL PREGNANCY, ANTEPARTUM: Primary | ICD-10-CM

## 2022-09-21 LAB
ABO GROUP BLD: NORMAL
B-HCG UR QL: POSITIVE
BASOPHILS # BLD AUTO: 0.03 10*3/MM3 (ref 0–0.2)
BASOPHILS NFR BLD AUTO: 0.3 % (ref 0–1.5)
BLD GP AB SCN SERPL QL: NEGATIVE
DEPRECATED RDW RBC AUTO: 41.4 FL (ref 37–54)
EOSINOPHIL # BLD AUTO: 0.13 10*3/MM3 (ref 0–0.4)
EOSINOPHIL NFR BLD AUTO: 1.1 % (ref 0.3–6.2)
ERYTHROCYTE [DISTWIDTH] IN BLOOD BY AUTOMATED COUNT: 12 % (ref 12.3–15.4)
EXPIRATION DATE: ABNORMAL
GLUCOSE UR STRIP-MCNC: NEGATIVE MG/DL
HBV SURFACE AG SERPL QL IA: NORMAL
HCT VFR BLD AUTO: 38.5 % (ref 34–46.6)
HCV AB SER DONR QL: NORMAL
HGB BLD-MCNC: 12.8 G/DL (ref 12–15.9)
HIV1+2 AB SER QL: NORMAL
IMM GRANULOCYTES # BLD AUTO: 0.06 10*3/MM3 (ref 0–0.05)
IMM GRANULOCYTES NFR BLD AUTO: 0.5 % (ref 0–0.5)
INTERNAL NEGATIVE CONTROL: ABNORMAL
INTERNAL POSITIVE CONTROL: ABNORMAL
LYMPHOCYTES # BLD AUTO: 2.03 10*3/MM3 (ref 0.7–3.1)
LYMPHOCYTES NFR BLD AUTO: 17 % (ref 19.6–45.3)
Lab: ABNORMAL
MCH RBC QN AUTO: 31.2 PG (ref 26.6–33)
MCHC RBC AUTO-ENTMCNC: 33.2 G/DL (ref 31.5–35.7)
MCV RBC AUTO: 93.9 FL (ref 79–97)
MONOCYTES # BLD AUTO: 0.56 10*3/MM3 (ref 0.1–0.9)
MONOCYTES NFR BLD AUTO: 4.7 % (ref 5–12)
NEUTROPHILS NFR BLD AUTO: 76.4 % (ref 42.7–76)
NEUTROPHILS NFR BLD AUTO: 9.13 10*3/MM3 (ref 1.7–7)
NRBC BLD AUTO-RTO: 0 /100 WBC (ref 0–0.2)
PLATELET # BLD AUTO: 258 10*3/MM3 (ref 140–450)
PMV BLD AUTO: 10.3 FL (ref 6–12)
PROT UR STRIP-MCNC: NEGATIVE MG/DL
RBC # BLD AUTO: 4.1 10*6/MM3 (ref 3.77–5.28)
RH BLD: POSITIVE
RPR SER QL: NORMAL
WBC NRBC COR # BLD: 11.94 10*3/MM3 (ref 3.4–10.8)

## 2022-09-21 PROCEDURE — 81025 URINE PREGNANCY TEST: CPT | Performed by: OBSTETRICS & GYNECOLOGY

## 2022-09-21 PROCEDURE — 87624 HPV HI-RISK TYP POOLED RSLT: CPT | Performed by: OBSTETRICS & GYNECOLOGY

## 2022-09-21 PROCEDURE — 87591 N.GONORRHOEAE DNA AMP PROB: CPT | Performed by: OBSTETRICS & GYNECOLOGY

## 2022-09-21 PROCEDURE — G0432 EIA HIV-1/HIV-2 SCREEN: HCPCS | Performed by: OBSTETRICS & GYNECOLOGY

## 2022-09-21 PROCEDURE — 86803 HEPATITIS C AB TEST: CPT | Performed by: OBSTETRICS & GYNECOLOGY

## 2022-09-21 PROCEDURE — 83020 HEMOGLOBIN ELECTROPHORESIS: CPT | Performed by: OBSTETRICS & GYNECOLOGY

## 2022-09-21 PROCEDURE — 87491 CHLMYD TRACH DNA AMP PROBE: CPT | Performed by: OBSTETRICS & GYNECOLOGY

## 2022-09-21 PROCEDURE — G0123 SCREEN CERV/VAG THIN LAYER: HCPCS | Performed by: OBSTETRICS & GYNECOLOGY

## 2022-09-21 PROCEDURE — 87661 TRICHOMONAS VAGINALIS AMPLIF: CPT | Performed by: OBSTETRICS & GYNECOLOGY

## 2022-09-21 PROCEDURE — 99204 OFFICE O/P NEW MOD 45 MIN: CPT | Performed by: OBSTETRICS & GYNECOLOGY

## 2022-09-21 PROCEDURE — 87086 URINE CULTURE/COLONY COUNT: CPT | Performed by: OBSTETRICS & GYNECOLOGY

## 2022-09-21 RX ORDER — LEVOTHYROXINE SODIUM 0.05 MG/1
1 TABLET ORAL DAILY
COMMUNITY
Start: 2022-09-14 | End: 2022-10-14

## 2022-09-21 RX ORDER — MV-MN 110/FA/OM3/DHA/EPA/FISH 180-35-25
1 TABLET,CHEWABLE ORAL DAILY
COMMUNITY
Start: 2022-08-01 | End: 2022-11-09

## 2022-09-21 NOTE — PROGRESS NOTES
OB Initial Visit    CC- Here for care of current pregnancy     Subjective:  22 y.o.  presenting for her first obstetrical visit.    LMP: Patient's last menstrual period was 2022. sure    COMPLAINS OF: Pt has no complaints, is doing well   Positive baby movement    Reviewed and updated:  OBHx, GYNHx (STDs), PMHx, Medications, Allergies, PSHx, Social Hx, Preventative Hx (PAP), Hx of abuse/safe environment, Vaccine Hx including hx of chickenpox or vaccine, Genetic Hx (pt, FOB, both families).        Objective:  /67   Wt 84.1 kg (185 lb 6.4 oz)   LMP 2022   BMI 32.84 kg/m²   General- NAD, alert and oriented, appropriate  Psych- Normal mood, good memory  Neck- No masses, no thyroid enlargement  CV- Regular rhythm, no murnurs  Resp- CTA to bases, no wheezes  Abdomen- Soft, gravid  External genitalia- Normal, no lesions  Urethra- Normal, no masses, non tender  Vagina- Normal, no discharge  Bladder- Normal, no masses, non tender  Cvx- Normal, closed  Uterus- Normal shape and consistency, non tender  Adnexa- Normal, no mass, non tender    Lymphatic- No palpable neck, axillary, or groin nodes  Ext- No edema, no cyanosis    Skin- No lesions, no rashes, no acanthosis nigricans      Assessment and Plan:  Diagnoses and all orders for this visit:    1. Supervision of other normal pregnancy, antepartum (Primary)  -     IGP,CtNgTv,rfx Aptima HPV ASCU  -     OB Panel With HIV  -     Hemoglobinopathy Fractionation Cascade  -     Urine Culture - Urine, Urine, Clean Catch  -     POC Urinalysis Dipstick  -     POC Pregnancy, Urine  -     US Ob 14 + Weeks Single or First Gestation; Future        17w1d    Genetic Screening: Counseled.  Declines all.     Vaccines: Declines COVID vaccine    Counseling: Nutrition discussed, calories, activity/exercise in pregnancy  Discussed dietary restrictions/safety food preparation in pregnancy  Reviewed what to expect prenatal visits, office providers    Labs: Prenatal labs,  cultures, and PAP performed (prn)    Return in about 3 weeks (around 10/12/2022).    Issa Asencio Sr., MD  09/21/2022

## 2022-09-22 LAB — BACTERIA SPEC AEROBE CULT: NO GROWTH

## 2022-09-23 LAB
HGB A MFR BLD ELPH: 97.5 % (ref 96.4–98.8)
HGB A2 MFR BLD ELPH: 2.5 % (ref 1.8–3.2)
HGB F MFR BLD ELPH: 0 % (ref 0–2)
HGB FRACT BLD-IMP: NORMAL
HGB S MFR BLD ELPH: 0 %
RUBV IGG SERPL IA-ACNC: 5.65 INDEX

## 2022-10-03 LAB
C TRACH RRNA CVX QL NAA+PROBE: NEGATIVE
CONV .: ABNORMAL
CYTOLOGIST CVX/VAG CYTO: ABNORMAL
CYTOLOGY CVX/VAG DOC CYTO: ABNORMAL
CYTOLOGY CVX/VAG DOC THIN PREP: ABNORMAL
DX ICD CODE: ABNORMAL
DX ICD CODE: ABNORMAL
HIV 1 & 2 AB SER-IMP: ABNORMAL
HPV I/H RISK 4 DNA CVX QL PROBE+SIG AMP: NEGATIVE
N GONORRHOEA RRNA CVX QL NAA+PROBE: NEGATIVE
OTHER STN SPEC: ABNORMAL
PATHOLOGIST CVX/VAG CYTO: ABNORMAL
RECOM F/U CVX/VAG CYTO: ABNORMAL
STAT OF ADQ CVX/VAG CYTO-IMP: ABNORMAL
T VAGINALIS RRNA SPEC QL NAA+PROBE: NEGATIVE

## 2022-10-04 ENCOUNTER — TELEPHONE (OUTPATIENT)
Dept: OBSTETRICS AND GYNECOLOGY | Facility: CLINIC | Age: 22
End: 2022-10-04

## 2022-10-04 NOTE — TELEPHONE ENCOUNTER
OB PT REQUESTING A CONSENT FORM BE FAXED TO HER DENTIST OFFICE AT [facility]. FAX # [phone]. PT CAN BE REACHED ANYTIME, OK TO M.

## 2022-10-05 ENCOUNTER — REFERRAL TRIAGE (OUTPATIENT)
Dept: LABOR AND DELIVERY | Facility: HOSPITAL | Age: 22
End: 2022-10-05

## 2022-10-14 ENCOUNTER — PATIENT OUTREACH (OUTPATIENT)
Dept: LABOR AND DELIVERY | Facility: HOSPITAL | Age: 22
End: 2022-10-14

## 2022-10-14 ENCOUNTER — ROUTINE PRENATAL (OUTPATIENT)
Dept: OBSTETRICS AND GYNECOLOGY | Facility: CLINIC | Age: 22
End: 2022-10-14

## 2022-10-14 VITALS — WEIGHT: 191.8 LBS | BODY MASS INDEX: 33.98 KG/M2 | DIASTOLIC BLOOD PRESSURE: 66 MMHG | SYSTOLIC BLOOD PRESSURE: 119 MMHG

## 2022-10-14 DIAGNOSIS — O09.90 HIGH RISK PREGNANCY, ANTEPARTUM: Primary | ICD-10-CM

## 2022-10-14 DIAGNOSIS — E03.9 HYPOTHYROID IN PREGNANCY, ANTEPARTUM: ICD-10-CM

## 2022-10-14 DIAGNOSIS — O99.280 HYPOTHYROID IN PREGNANCY, ANTEPARTUM: ICD-10-CM

## 2022-10-14 PROBLEM — O09.40 ENCOUNTER FOR SUPERVISION OF HIGH RISK PREGNANCY WITH GRAND MULTIPARITY, ANTEPARTUM: Status: RESOLVED | Noted: 2022-10-14 | Resolved: 2022-10-14

## 2022-10-14 PROBLEM — R53.83 FATIGUE: Status: RESOLVED | Noted: 2021-08-25 | Resolved: 2022-10-14

## 2022-10-14 PROBLEM — D50.9 IRON DEFICIENCY ANEMIA: Status: RESOLVED | Noted: 2021-12-07 | Resolved: 2022-10-14

## 2022-10-14 PROBLEM — R19.8 ALTERNATING CONSTIPATION AND DIARRHEA: Status: RESOLVED | Noted: 2021-08-25 | Resolved: 2022-10-14

## 2022-10-14 PROBLEM — O09.40 ENCOUNTER FOR SUPERVISION OF HIGH RISK PREGNANCY WITH GRAND MULTIPARITY, ANTEPARTUM: Status: ACTIVE | Noted: 2022-10-14

## 2022-10-14 PROBLEM — R14.0 ABDOMINAL BLOATING: Status: RESOLVED | Noted: 2021-12-07 | Resolved: 2022-10-14

## 2022-10-14 LAB
GLUCOSE UR STRIP-MCNC: NEGATIVE MG/DL
PROT UR STRIP-MCNC: NEGATIVE MG/DL
T4 FREE SERPL-MCNC: 0.98 NG/DL (ref 0.93–1.7)
TSH SERPL DL<=0.05 MIU/L-ACNC: 1.44 UIU/ML (ref 0.27–4.2)

## 2022-10-14 PROCEDURE — 84443 ASSAY THYROID STIM HORMONE: CPT | Performed by: OBSTETRICS & GYNECOLOGY

## 2022-10-14 PROCEDURE — 99214 OFFICE O/P EST MOD 30 MIN: CPT | Performed by: OBSTETRICS & GYNECOLOGY

## 2022-10-14 PROCEDURE — 84439 ASSAY OF FREE THYROXINE: CPT | Performed by: OBSTETRICS & GYNECOLOGY

## 2022-10-14 RX ORDER — LEVOTHYROXINE SODIUM 0.05 MG/1
1 TABLET ORAL DAILY
COMMUNITY
Start: 2022-07-01 | End: 2022-11-28

## 2022-10-14 NOTE — OUTREACH NOTE
[facility]  Intake    Current Estimated Gestational Age: 20w3d    Questions/Answers    Flowsheet Row Responses   Best Method for Contacting Cell   Resources Presently Utilizing: WIC (Women, Infant, Children), HANDS   Other: Provided          Met with briefly at office. Has not delivered at [facility] with previous pregnancies. Currently enrolled in HANDS and WIC. No current needs, questions, concerns.     Valentina Bedolla RN  Maternity Nurse Navigator    10/14/2022, 16:49 EDT

## 2022-10-14 NOTE — PROGRESS NOTES
Routine Prenatal Visit     Claire Son is a 22 y.o.  at 20w3d here for her routine OB visit.   She is taking her prenatal vitamins.Reports no loss of fluid or vaginal bleeding. Patient doing well without any complaints. Pregnancy is complicated by:     Patient Active Problem List   Diagnosis   • Hypothyroidism   • Class 1 obesity due to excess calories without serious comorbidity with body mass index (BMI) of 33.0 to 33.9 in adult   • Encounter for supervision of high risk pregnancy with grand multiparity, antepartum     SIRI finalize:Estimated Date of Delivery: 23      Genetic testing (NIPS-Quad)/CF/AFP:  Declined     COVID: Recommended   Flu: Recommended   Tdap:Recomened     Rhogam: AB Positive    Sterilization:none     Anatomy US:All anatomy visualized and WNL, growth at 77%, 3-vessel cord, cephalic, anterior placenta   FU US:        OB History    Para Term  AB Living   3 2 2     1   SAB IAB Ectopic Molar Multiple Live Births             1      # Outcome Date GA Lbr Trino/2nd Weight Sex Delivery Anes PTL Lv   3 Current            2 Term 21   4848 g (10 lb 11 oz)  Vag-Spont   ROSELYN      Complications: Shoulder Dystocia   1 Term 17     Vag-Spont              ROS:   General ROS: negative for - chills or fatigue  Respiratory ROS: negative for - cough or hemoptysis  Cardiovascular ROS: negative for - chest pain or dyspnea on exertion  Genito-Urinary ROS: negative for  change in urinary stream, vaginal discharge   Musculoskeletal ROS: negative for - gait disturbance or joint pain  Dermatological ROS: negative for acne,  dry skin or itching    Objective  Physical Exam:   Vitals:    10/14/22 1615   BP: 119/66       Uterine Size: size equals dates  FHT: 110-160 BPM    General appearance - alert, well appearing, and in no distress  Mental status - alert, oriented to person, place, and time  Abdomen- Soft, Gravid uterus, non-tender to palpation  Musculoskeletal: negative  for - gait disturbance or joint pain  Extremeties: negative swelling or cyanosis   Dermatological: negative rashes or skin lesions     Assessment/Plan:   Diagnoses and all orders for this visit:    1. High risk pregnancy, antepartum (Primary)  -     POC Urinalysis Dipstick    2. Hypothyroid in pregnancy, antepartum  -     TSH; Future  -     T4, free; Future          Counseling:   Second trimester precautions  Round Ligament Pain:  The uterus has several ligaments which provide support and keep the uterus in place. As the  uterus grows these ligaments are pulled and stretched which often causes sharp stabbing like pain in the inguinal area.   You may find a pregnancy support band helpful. Changing positions may also help. Yoga is a great way to cope with round ligament and low back pain in pregnancy.    Massage may also help with low back pain   Things to Consider at this Point in your Pregnancy:  Some women experience swelling in their feet during pregnancy. Compression stockings may help  Drink plenty of water and stay active   Make sure you are eating frequent small meals, nuts are a wonderful snack to keep with you            Return in about 4 weeks (around 11/11/2022) for Routine OB visit.      We have gone over prenatal care to include the timing and content of visits. I informed her how to contact the office and/or on call person in the event of any problems and encouraged her to do so when she feels it is necessary.  We then spent time answering her questions which she indicated were answered to her satisfaction.    Precious Mendoza DO  10/14/2022 16:33 EDT

## 2022-11-08 NOTE — PROGRESS NOTES
Routine Prenatal Visit     Subjective  Carmina Son is a 22 y.o.  at 24w0d here for her routine OB visit.   She is taking her prenatal vitamins.Reports no loss of fluid or vaginal bleeding. Patient doing well without any complaints. Pregnancy is complicated by:     Patient Active Problem List   Diagnosis   • Hypothyroidism   • Class 1 obesity due to excess calories without serious comorbidity with body mass index (BMI) of 33.0 to 33.9 in adult   • Encounter for supervision of high risk pregnancy with grand multiparity, antepartum   • Vertigo       Patient ate and drank a big red prior to her appointment. We will just obtain 1 hr GTT at next visit.   OB History    Para Term  AB Living   3 2 2     1   SAB IAB Ectopic Molar Multiple Live Births             1      # Outcome Date GA Lbr Trino/2nd Weight Sex Delivery Anes PTL Lv   3 Current            2 Term 21   4848 g (10 lb 11 oz)  Vag-Spont   ROSELYN      Complications: Shoulder Dystocia   1 Term 17     Vag-Spont              ROS:   General ROS: negative for - chills or fatigue  Respiratory ROS: negative for - cough or hemoptysis  Cardiovascular ROS: negative for - chest pain or dyspnea on exertion  Genito-Urinary ROS: negative for  change in urinary stream, vaginal discharge  Musculoskeletal ROS: negative for - gait disturbance or joint pain  Dermatological ROS: negative for acne,  dry skin or itching    Objective  Physical Exam:   Vitals:    11/10/22 1423   BP: 102/68       Uterine Size: size equals dates  FHT: 110-160 BPM      General appearance - alert, well appearing, and in no distress  Mental status - alert, oriented to person, place, and time  Abdomen- Soft, Gravid uterus, non-tender to palpation  Musculoskeletal: negative for - gait disturbance or joint pain  Extremeties: negative swelling or cyanosis   Dermatological: negative rashes or skin lesions       Assessment/Plan:   Diagnoses and all orders for this visit:    1.  Encounter for supervision of high risk pregnancy with grand multiparity, antepartum (Primary)  Assessment & Plan:  SIRI finalize:Estimated Date of Delivery: 2/28/23      Genetic testing (NIPS-Quad)/CF/AFP:  Declined   COVID:   Flu:   Tdap:    Rhogam: AB Positive    Sterilization:none     Anatomy US:Growth 77%, anterior placenta, 3VC, all anatomy seen and WNL  FU US    Orders:  -     Gestational Diabetes Screen 1 Hour; Future  -     CBC & Differential; Future  -     POC Urinalysis Dipstick    2. Class 1 obesity due to excess calories without serious comorbidity with body mass index (BMI) of 33.0 to 33.9 in adult    3. Hypothyroidism, unspecified type  Assessment & Plan:  Synthroid 50mcg daily   TSH/T4 WNL 10/14          Counseling:   Second trimester precautions  Round Ligament Pain:  The uterus has several ligaments which provide support and keep the uterus in place. As the  uterus grows these ligaments are pulled and stretched which often causes sharp stabbing like pain in the inguinal area.   You may find a pregnancy support band helpful. Changing positions may also help. Yoga is a great way to cope with round ligament and low back pain in pregnancy.    Massage may also help with low back pain   Things to Consider at this Point in your Pregnancy:  Some women experience swelling in their feet during pregnancy. Compression stockings may help  Drink plenty of water and stay active   Make sure you are eating frequent small meals, nuts are a wonderful snack to keep with you            Return in about 4 weeks (around 12/8/2022) for Routine OB visit.      We have gone over prenatal care to include the timing and content of visits. I informed her how to contact the office and/or on call person in the event of any problems and encouraged her to do so when she feels it is necessary.  We then spent time answering her questions which she indicated were answered to her satisfaction.    Precious Mendoza DO  11/10/2022 14:37 EST

## 2022-11-08 NOTE — ASSESSMENT & PLAN NOTE
SIRI finalize:Estimated Date of Delivery: 2/28/23      Genetic testing (NIPS-Quad)/CF/AFP:  Declined   COVID:   Flu:   Tdap:    Rhogam: AB Positive    Sterilization:none     Anatomy US:Growth 77%, anterior placenta, 3VC, all anatomy seen and WNL  FU US

## 2022-11-09 ENCOUNTER — OFFICE VISIT (OUTPATIENT)
Dept: FAMILY MEDICINE CLINIC | Facility: CLINIC | Age: 22
End: 2022-11-09

## 2022-11-09 VITALS
SYSTOLIC BLOOD PRESSURE: 133 MMHG | DIASTOLIC BLOOD PRESSURE: 64 MMHG | WEIGHT: 200 LBS | HEART RATE: 89 BPM | OXYGEN SATURATION: 98 % | BODY MASS INDEX: 35.44 KG/M2 | HEIGHT: 63 IN

## 2022-11-09 DIAGNOSIS — R42 VERTIGO: Primary | ICD-10-CM

## 2022-11-09 PROCEDURE — 99213 OFFICE O/P EST LOW 20 MIN: CPT | Performed by: NURSE PRACTITIONER

## 2022-11-09 RX ORDER — MECLIZINE HYDROCHLORIDE 25 MG/1
25 TABLET ORAL 3 TIMES DAILY PRN
Status: ON HOLD | COMMUNITY
Start: 2022-10-17 | End: 2023-02-19

## 2022-11-09 NOTE — ASSESSMENT & PLAN NOTE
Discussed with patient that it is possible that this fluid is contributing to the dizziness, admits to some allergy symptoms, discussed that I would recommend trying to better control allergies, recommend Zyrtec, patient may discuss this with her OB/GYN appointment tomorrow, follow-up as needed

## 2022-11-09 NOTE — PROGRESS NOTES
Chief Complaint  Dizziness (.), ER follow up, and Ear Fullness (KAILASH)    SUBJECTIVE  Carmina Son presents to [facility] following up from ER visit on 10/17/22 for dizziness.     Pt states she feels water in her ears.  States the dizziness has improved significantly    Pt is currently 24 weeks gestation. Has appt tomorrow with her OBGYN Dr. Mendoza.     States that she was seen in the ER for dizziness, everything was normal, states they told her it was probably due to fluid on her ears because she states that she felt water in her ears, states however they never actually looked at her ear.    History of Present Illness  Past Medical History:   Diagnosis Date   • Anemia    • Anxiety    • Depression    • Headache    • Hypothyroidism       Family History   Problem Relation Age of Onset   • Stroke Mother    • Diabetes Mother    • Hypertension Mother    • Irritable bowel syndrome Mother    • Depression Mother    • Drug abuse Mother    • Miscarriages / Stillbirths Mother    • Thyroid disease Mother    • Crohn's disease Maternal Grandmother    • Diabetes Father    • Drug abuse Father    • Hypertension Father    • Mental illness Father    • Stroke Father    • Hearing loss Paternal Grandfather    • Arthritis Paternal Grandmother    • Asthma Sister    • Asthma Sister       Past Surgical History:   Procedure Laterality Date   • CHOLECYSTECTOMY     • COLONOSCOPY     • COLONOSCOPY N/A 2/22/2022    Procedure: COLONOSCOPY with random biopsies;  Surgeon: Allegra Capellan MD;  Location: [facility] ENDOSCOPY;  Service: Gastroenterology;  Laterality: N/A;  normal   • ENDOSCOPY     • ENDOSCOPY N/A 2/22/2022    Procedure: ESOPHAGOGASTRODUODENOSCOPY with biopsies;  Surgeon: Allegra Capellan MD;  Location: [facility] ENDOSCOPY;  Service: Gastroenterology;  Laterality: N/A;  gastritis and HH   • HYSTEROSCOPY     • TONSILLECTOMY     • TUBAL ABDOMINAL LIGATION     • UPPER GASTROINTESTINAL ENDOSCOPY    "       Current Outpatient Medications:   •  levothyroxine (SYNTHROID, LEVOTHROID) 50 MCG tablet, Take 1 tablet by mouth Daily., Disp: , Rfl:   •  meclizine (ANTIVERT) 25 MG tablet, Take 1 tablet by mouth 3 (Three) Times a Day As Needed., Disp: , Rfl:   •  Prenatal Multivit-Min-Fe-FA (PRE- PO), Take 1 Piece by mouth., Disp: , Rfl:     OBJECTIVE  Vital Signs:   /64   Pulse 89   Ht 160 cm (63\")   Wt 90.7 kg (200 lb)   LMP 2022   SpO2 98%   BMI 35.43 kg/m²    Estimated body mass index is 35.43 kg/m² as calculated from the following:    Height as of this encounter: 160 cm (63\").    Weight as of this encounter: 90.7 kg (200 lb).     Wt Readings from Last 3 Encounters:   22 90.7 kg (200 lb)   10/14/22 87 kg (191 lb 12.8 oz)   22 84.1 kg (185 lb 6.4 oz)     BP Readings from Last 3 Encounters:   22 133/64   10/14/22 119/66   22 124/67       Physical Exam  Vitals reviewed.   Constitutional:       Appearance: Normal appearance. She is well-developed.   HENT:      Head: Normocephalic and atraumatic.      Right Ear: Tympanic membrane, ear canal and external ear normal.      Left Ear: Ear canal and external ear normal.      Ears:      Comments: Trace fluid left TM  Eyes:      Conjunctiva/sclera: Conjunctivae normal.      Pupils: Pupils are equal, round, and reactive to light.   Cardiovascular:      Rate and Rhythm: Normal rate and regular rhythm.      Heart sounds: No murmur heard.    No friction rub. No gallop.   Pulmonary:      Effort: Pulmonary effort is normal.      Breath sounds: Normal breath sounds. No wheezing or rhonchi.   Skin:     General: Skin is warm and dry.   Neurological:      Mental Status: She is alert and oriented to person, place, and time.      Cranial Nerves: No cranial nerve deficit.   Psychiatric:         Mood and Affect: Mood and affect normal.         Behavior: Behavior normal.         Thought Content: Thought content normal.         Judgment: Judgment " normal.          Result Review         CBC    CBC 9/21/22   WBC 11.94 (A)   RBC 4.10   Hemoglobin 12.8   Hematocrit 38.5   MCV 93.9   MCH 31.2   MCHC 33.2   RDW 12.0 (A)   Platelets 258   (A) Abnormal value            TSH    TSH 10/14/22   TSH 1.440                 US Abdomen Complete    Result Date: 7/19/2022  Normal abdominal ultrasound examination after cholecystectomy. Electronically Signed: Jarod Barnes MD 2022/07/19 at 16:40 CDT Reading Location ID and State: [address] / [place] Tel [phone], Service support  [phone], Fax [phone]    US Ob < 14 Weeks Single or First Gestation    Result Date: 7/13/2022  Living intrauterine pregnancy of 7 weeks 2 days as described above. Electronically Signed: Jarod Barnes MD 2022/07/13 at 13:45 CDT Reading Location ID and State: [address] / [place] Tel [phone], Service support  [phone], Fax [phone]       The above data has been reviewed by NI Alas 11/09/2022 11:37 EST.          Patient Care Team:  Jenny Cartwright APRN as PCP - General (Nurse Practitioner)  Issa Asencio Sr., MD as Consulting Physician (Obstetrics and Gynecology)  Valentina Bedolla, PRUDENCE as Nurse Navigator (Obstetrics)  Vandana Gutierrez MA as Medical Assistant        ASSESSMENT & PLAN    Diagnoses and all orders for this visit:    1. Vertigo (Primary)  Assessment & Plan:  Discussed with patient that it is possible that this fluid is contributing to the dizziness, admits to some allergy symptoms, discussed that I would recommend trying to better control allergies, recommend Zyrtec, patient may discuss this with her OB/GYN appointment tomorrow, follow-up as needed         Tobacco Use: Medium Risk   • Smoking Tobacco Use: Former   • Smokeless Tobacco Use: Never   • Passive Exposure: Not on file       Follow Up     Return if symptoms worsen or fail to improve.        Patient was given instructions and counseling regarding her condition or for health maintenance advice. Please  see specific information pulled into the AVS if appropriate.   I have reviewed information obtained and documented by others and I have confirmed the accuracy of this documented note.    NI Alas

## 2022-11-10 ENCOUNTER — ROUTINE PRENATAL (OUTPATIENT)
Dept: OBSTETRICS AND GYNECOLOGY | Facility: CLINIC | Age: 22
End: 2022-11-10

## 2022-11-10 VITALS — SYSTOLIC BLOOD PRESSURE: 102 MMHG | WEIGHT: 200.8 LBS | DIASTOLIC BLOOD PRESSURE: 68 MMHG | BODY MASS INDEX: 35.57 KG/M2

## 2022-11-10 DIAGNOSIS — O09.40 ENCOUNTER FOR SUPERVISION OF HIGH RISK PREGNANCY WITH GRAND MULTIPARITY, ANTEPARTUM: Primary | ICD-10-CM

## 2022-11-10 DIAGNOSIS — E66.09 CLASS 1 OBESITY DUE TO EXCESS CALORIES WITHOUT SERIOUS COMORBIDITY WITH BODY MASS INDEX (BMI) OF 33.0 TO 33.9 IN ADULT: ICD-10-CM

## 2022-11-10 DIAGNOSIS — E03.9 HYPOTHYROIDISM, UNSPECIFIED TYPE: ICD-10-CM

## 2022-11-10 LAB
GLUCOSE UR STRIP-MCNC: NEGATIVE MG/DL
PROT UR STRIP-MCNC: NEGATIVE MG/DL

## 2022-11-10 PROCEDURE — 99214 OFFICE O/P EST MOD 30 MIN: CPT | Performed by: OBSTETRICS & GYNECOLOGY

## 2022-11-14 ENCOUNTER — TELEPHONE (OUTPATIENT)
Dept: FAMILY MEDICINE CLINIC | Facility: CLINIC | Age: 22
End: 2022-11-14

## 2022-11-14 ENCOUNTER — PATIENT MESSAGE (OUTPATIENT)
Dept: FAMILY MEDICINE CLINIC | Facility: CLINIC | Age: 22
End: 2022-11-14

## 2022-11-14 NOTE — TELEPHONE ENCOUNTER
Pt said that she discussed medication w you at her last appt and since it was her previous pcp that rx it, she was hoping that you would start filling it

## 2022-11-14 NOTE — TELEPHONE ENCOUNTER
Yes, we can prescribe this medication for the patient, once she needs refills she may send a refill request here

## 2022-11-14 NOTE — TELEPHONE ENCOUNTER
----- Message from Orquidea Villarreal sent at 11/14/2022  3:13 PM EST -----  Regarding: FW: Euthyrox  Contact: [phone]    ----- Message -----  From: Carmina Son  Sent: 11/14/2022   3:03 PM EST  To: [facility]  Subject: Euthyrox                                         I just got a notification from [facility]. They have a full prescription of levothyrpxine for me. It's not something I can keep taking because it gives me migraines but I can use it until we get th euthyrox figured out

## 2022-11-14 NOTE — TELEPHONE ENCOUNTER
Please notify the patient that we did not prescribe this medication, it may have came from her OB/GYN, however I do not see it is being prescribed through our review of epic at all.  I recommend she contact the pharmacy and find out who the prescriber is

## 2022-11-28 ENCOUNTER — TELEPHONE (OUTPATIENT)
Dept: FAMILY MEDICINE CLINIC | Facility: CLINIC | Age: 22
End: 2022-11-28

## 2022-11-28 RX ORDER — LEVOTHYROXINE SODIUM 50 UG/1
50 TABLET ORAL DAILY
Qty: 30 TABLET | Refills: 1 | Status: SHIPPED | OUTPATIENT
Start: 2022-11-28 | End: 2023-01-23 | Stop reason: SDUPTHER

## 2022-11-28 NOTE — TELEPHONE ENCOUNTER
----- Message from Magdalena Warner MA sent at 11/28/2022  6:40 AM EST -----  Regarding: FW: Euthyrox  Contact: [phone]    ----- Message -----  From: Carmina Son  Sent: 11/25/2022  11:10 AM EST  To: [facility]  Subject: Euthyrox                                         I was wondering about the euthyrox? The levo is already starting to give me headaches like it did before

## 2022-11-28 NOTE — TELEPHONE ENCOUNTER
Please inform patient that I have sent in a prescription for the brand name Eurthyrox, however it is very likely that her insurance will decline it, if they do decline, we may try brand-name Synthroid instead.  Not sure what her insurance will cover, but we will start here.

## 2022-12-09 ENCOUNTER — ROUTINE PRENATAL (OUTPATIENT)
Dept: OBSTETRICS AND GYNECOLOGY | Facility: CLINIC | Age: 22
End: 2022-12-09

## 2022-12-09 VITALS — SYSTOLIC BLOOD PRESSURE: 109 MMHG | BODY MASS INDEX: 36.14 KG/M2 | DIASTOLIC BLOOD PRESSURE: 70 MMHG | WEIGHT: 204 LBS

## 2022-12-09 DIAGNOSIS — O09.40 ENCOUNTER FOR SUPERVISION OF HIGH RISK PREGNANCY WITH GRAND MULTIPARITY, ANTEPARTUM: Primary | ICD-10-CM

## 2022-12-09 DIAGNOSIS — E03.9 HYPOTHYROIDISM, UNSPECIFIED TYPE: ICD-10-CM

## 2022-12-09 DIAGNOSIS — O26.849 UTERINE SIZE DATE DISCREPANCY PREGNANCY: ICD-10-CM

## 2022-12-09 DIAGNOSIS — E66.09 CLASS 1 OBESITY DUE TO EXCESS CALORIES WITHOUT SERIOUS COMORBIDITY WITH BODY MASS INDEX (BMI) OF 33.0 TO 33.9 IN ADULT: ICD-10-CM

## 2022-12-09 LAB
BASOPHILS # BLD AUTO: 0.05 10*3/MM3 (ref 0–0.2)
BASOPHILS NFR BLD AUTO: 0.5 % (ref 0–1.5)
DEPRECATED RDW RBC AUTO: 41.7 FL (ref 37–54)
EOSINOPHIL # BLD AUTO: 0.08 10*3/MM3 (ref 0–0.4)
EOSINOPHIL NFR BLD AUTO: 0.9 % (ref 0.3–6.2)
ERYTHROCYTE [DISTWIDTH] IN BLOOD BY AUTOMATED COUNT: 12.7 % (ref 12.3–15.4)
GLUCOSE 1H P GLC SERPL-MCNC: 93 MG/DL (ref 65–139)
GLUCOSE UR STRIP-MCNC: NEGATIVE MG/DL
HCT VFR BLD AUTO: 32.6 % (ref 34–46.6)
HGB BLD-MCNC: 10.9 G/DL (ref 12–15.9)
IMM GRANULOCYTES # BLD AUTO: 0.05 10*3/MM3 (ref 0–0.05)
IMM GRANULOCYTES NFR BLD AUTO: 0.5 % (ref 0–0.5)
LYMPHOCYTES # BLD AUTO: 2.18 10*3/MM3 (ref 0.7–3.1)
LYMPHOCYTES NFR BLD AUTO: 23.3 % (ref 19.6–45.3)
MCH RBC QN AUTO: 30.6 PG (ref 26.6–33)
MCHC RBC AUTO-ENTMCNC: 33.4 G/DL (ref 31.5–35.7)
MCV RBC AUTO: 91.6 FL (ref 79–97)
MONOCYTES # BLD AUTO: 0.64 10*3/MM3 (ref 0.1–0.9)
MONOCYTES NFR BLD AUTO: 6.8 % (ref 5–12)
NEUTROPHILS NFR BLD AUTO: 6.37 10*3/MM3 (ref 1.7–7)
NEUTROPHILS NFR BLD AUTO: 68 % (ref 42.7–76)
NRBC BLD AUTO-RTO: 0 /100 WBC (ref 0–0.2)
PLATELET # BLD AUTO: 206 10*3/MM3 (ref 140–450)
PMV BLD AUTO: 10.4 FL (ref 6–12)
PROT UR STRIP-MCNC: NEGATIVE MG/DL
RBC # BLD AUTO: 3.56 10*6/MM3 (ref 3.77–5.28)
T4 FREE SERPL-MCNC: 0.86 NG/DL (ref 0.93–1.7)
TSH SERPL DL<=0.05 MIU/L-ACNC: 1.11 UIU/ML (ref 0.27–4.2)
WBC NRBC COR # BLD: 9.37 10*3/MM3 (ref 3.4–10.8)

## 2022-12-09 PROCEDURE — 82950 GLUCOSE TEST: CPT | Performed by: OBSTETRICS & GYNECOLOGY

## 2022-12-09 PROCEDURE — 99213 OFFICE O/P EST LOW 20 MIN: CPT | Performed by: OBSTETRICS & GYNECOLOGY

## 2022-12-09 PROCEDURE — 84439 ASSAY OF FREE THYROXINE: CPT | Performed by: OBSTETRICS & GYNECOLOGY

## 2022-12-09 PROCEDURE — 84443 ASSAY THYROID STIM HORMONE: CPT | Performed by: OBSTETRICS & GYNECOLOGY

## 2022-12-09 PROCEDURE — 85025 COMPLETE CBC W/AUTO DIFF WBC: CPT | Performed by: OBSTETRICS & GYNECOLOGY

## 2022-12-09 NOTE — ASSESSMENT & PLAN NOTE
SIRI finalize:Estimated Date of Delivery: 2/28/23      Genetic testing (NIPS-Quad)/CF/AFP:  Declined   COVID:   Flu:   Tdap:    Rhogam: AB Positive    Sterilization:none     Anatomy US:Growth 77%, anterior placenta, 3VC, all anatomy seen and WNL

## 2022-12-09 NOTE — PROGRESS NOTES
Routine Prenatal Visit     Subjective  Carmina Son is a 22 y.o.  at 28w3d here for her routine OB visit.   She is taking her prenatal vitamins.Reports no loss of fluid or vaginal bleeding. Patient doing well without any complaints. Pregnancy complicated by:     Patient Active Problem List   Diagnosis   • Hypothyroidism   • Class 1 obesity due to excess calories without serious comorbidity with body mass index (BMI) of 33.0 to 33.9 in adult   • Encounter for supervision of high risk pregnancy with grand multiparity, antepartum   • Vertigo         OB History    Para Term  AB Living   3 2 2     1   SAB IAB Ectopic Molar Multiple Live Births             1      # Outcome Date GA Lbr Trino/2nd Weight Sex Delivery Anes PTL Lv   3 Current            2 Term 21   4848 g (10 lb 11 oz)  Vag-Spont   ROSELYN      Complications: Shoulder Dystocia   1 Term 17     Vag-Spont              ROS:   General ROS: negative for - chills or fatigue  Respiratory ROS: negative for - cough or hemoptysis  Cardiovascular ROS: negative for - chest pain or dyspnea on exertion  Genito-Urinary ROS: negative for  change in urinary stream, vaginal discharge   Musculoskeletal ROS: negative for - gait disturbance or joint pain  Dermatological ROS: negative for acne,  dry skin or itching    Objective  Physical Exam:   Vitals:    22 1338   BP: 109/70       Uterine Size: size greater than dates  FHT: 110-160 BPM    General appearance - alert, well appearing, and in no distress  Mental status - alert, oriented to person, place, and time  Abdomen- Soft, Gravid uterus, non-tender to palpation  Musculoskeletal: negative for - gait disturbance or joint pain  Extremeties: negative swelling or cyanosis   Dermatological: negative rashes or skin lesions     Assessment/Plan:   Diagnoses and all orders for this visit:    1. Encounter for supervision of high risk pregnancy with grand multiparity, antepartum (Primary)  Assessment &  Plan:  SIRI finalize:Estimated Date of Delivery: 23      Genetic testing (NIPS-Quad)/CF/AFP:  Declined   COVID:   Flu:   Tdap:    Rhogam: AB Positive    Sterilization:none     Anatomy US:Growth 77%, anterior placenta, 3VC, all anatomy seen and WNL      Orders:  -     POC Urinalysis Dipstick  -     Gestational Diabetes Screen 1 Hour; Future  -     CBC & Differential; Future  -     Gestational Diabetes Screen 1 Hour  -     CBC & Differential    2. Class 1 obesity due to excess calories without serious comorbidity with body mass index (BMI) of 33.0 to 33.9 in adult  Assessment & Plan:  BMI 35, plan  testing 35 weeks       3. Hypothyroidism, unspecified type  Assessment & Plan:  Euthyrox 50mcg daily   TSH WNL 10/14  Repeat labs today     Orders:  -     TSH; Future  -     T4, free; Future  -     TSH  -     T4, free    4. Uterine size date discrepancy pregnancy  -     US Ob 14 + Weeks Single or First Gestation; Future          Counseling:   OB precautions, leaking, VB, nori mcwilliams vs PTL/Labor  HTN precautions, HA, vision change, RUQ/epigastric pain, edema  Round Ligament Pain:  The uterus has several ligaments which provide support and keep the uterus in place. As the  uterus grows these ligaments are pulled and stretched which often causes sharp stabbing like pain in the inguinal area.   You may find a pregnancy support band helpful. Changing positions may also help. Yoga is a great way to cope with round ligament and low back pain in pregnancy.    Massage may also help with low back pain   Things to Consider at this Point in your Pregnancy:  Some women experience swelling in their feet during pregnancy. Compression stockings may help  Drink plenty of water and stay active   Make sure you are eating frequent small meals, nuts are a wonderful snack to keep with you            Return in about 2 weeks (around 2022) for Routine OB visit.      We have gone over prenatal care to include the timing and  content of visits. I informed her how to contact the office and/or on call person in the event of any problems and encouraged her to do so when she feels it is necessary.  We then spent time answering her questions which she indicated were answered to her satisfaction.    Precious Mendoza DO  12/9/2022 15:17 EST

## 2022-12-28 ENCOUNTER — PATIENT MESSAGE (OUTPATIENT)
Dept: FAMILY MEDICINE CLINIC | Facility: CLINIC | Age: 22
End: 2022-12-28
Payer: COMMERCIAL

## 2022-12-29 ENCOUNTER — TELEPHONE (OUTPATIENT)
Dept: FAMILY MEDICINE CLINIC | Facility: CLINIC | Age: 22
End: 2022-12-29
Payer: COMMERCIAL

## 2022-12-30 ENCOUNTER — ROUTINE PRENATAL (OUTPATIENT)
Dept: OBSTETRICS AND GYNECOLOGY | Facility: CLINIC | Age: 22
End: 2022-12-30

## 2022-12-30 VITALS — BODY MASS INDEX: 36.49 KG/M2 | DIASTOLIC BLOOD PRESSURE: 76 MMHG | SYSTOLIC BLOOD PRESSURE: 121 MMHG | WEIGHT: 206 LBS

## 2022-12-30 DIAGNOSIS — O09.40 ENCOUNTER FOR SUPERVISION OF HIGH RISK PREGNANCY WITH GRAND MULTIPARITY, ANTEPARTUM: Primary | ICD-10-CM

## 2022-12-30 DIAGNOSIS — Z34.80 SUPERVISION OF OTHER NORMAL PREGNANCY, ANTEPARTUM: ICD-10-CM

## 2022-12-30 DIAGNOSIS — E66.09 CLASS 1 OBESITY DUE TO EXCESS CALORIES WITHOUT SERIOUS COMORBIDITY WITH BODY MASS INDEX (BMI) OF 33.0 TO 33.9 IN ADULT: ICD-10-CM

## 2022-12-30 DIAGNOSIS — E03.9 HYPOTHYROIDISM, UNSPECIFIED TYPE: ICD-10-CM

## 2022-12-30 LAB
GLUCOSE UR STRIP-MCNC: NEGATIVE MG/DL
PROT UR STRIP-MCNC: NEGATIVE MG/DL

## 2022-12-30 PROCEDURE — 99214 OFFICE O/P EST MOD 30 MIN: CPT | Performed by: OBSTETRICS & GYNECOLOGY

## 2022-12-30 RX ORDER — LEVOTHYROXINE SODIUM 0.05 MG/1
1 TABLET ORAL
COMMUNITY
Start: 2022-11-14 | End: 2023-03-30

## 2022-12-30 NOTE — ASSESSMENT & PLAN NOTE
SIRI finalize:Estimated Date of Delivery: 2/28/23      Genetic testing (NIPS-Quad)/CF/AFP:  Declined   COVID: Recommended   Flu: Recommended   Tdap:    Rhogam: AB Positive    Sterilization:none     Anatomy US:Growth 77%, anterior placenta, 3VC, all anatomy seen and WNL  Follow up US: growth 88%, HC 94%, vertex, anterior placenta-grade1

## 2022-12-30 NOTE — PROGRESS NOTES
Routine Prenatal Visit     Subjective  Carmina Son is a 22 y.o.  at 31w3d here for her routine OB visit.   She is taking her prenatal vitamins.Reports no loss of fluid or vaginal bleeding. Patient doing well without any complaints. Pregnancy complicated by:     Patient Active Problem List   Diagnosis   • Hypothyroidism   • Class 1 obesity due to excess calories without serious comorbidity with body mass index (BMI) of 33.0 to 33.9 in adult   • Encounter for supervision of high risk pregnancy with grand multiparity, antepartum   • Vertigo         OB History    Para Term  AB Living   3 2 2     1   SAB IAB Ectopic Molar Multiple Live Births             1      # Outcome Date GA Lbr Trino/2nd Weight Sex Delivery Anes PTL Lv   3 Current            2 Term 21   4848 g (10 lb 11 oz)  Vag-Spont   ROSELYN      Complications: Shoulder Dystocia   1 Term 17     Vag-Spont              ROS:   General ROS: negative for - chills or fatigue  Respiratory ROS: negative for - cough or hemoptysis  Cardiovascular ROS: negative for - chest pain or dyspnea on exertion  Genito-Urinary ROS: negative for  change in urinary stream, vaginal discharge   Musculoskeletal ROS: negative for - gait disturbance or joint pain  Dermatological ROS: negative for acne,  dry skin or itching    Objective  Physical Exam:   Vitals:    22 1631   BP: 121/76       Uterine Size: size greater than dates  FHT: 110-160 BPM    General appearance - alert, well appearing, and in no distress  Mental status - alert, oriented to person, place, and time  Abdomen- Soft, Gravid uterus, non-tender to palpation  Musculoskeletal: negative for - gait disturbance or joint pain  Extremeties: negative swelling or cyanosis   Dermatological: negative rashes or skin lesions       Assessment/Plan:   Diagnoses and all orders for this visit:    1. Encounter for supervision of high risk pregnancy with grand multiparity, antepartum (Primary)  Assessment  & Plan:  SIRI finalize:Estimated Date of Delivery: 2/28/23      Genetic testing (NIPS-Quad)/CF/AFP:  Declined   COVID: Recommended   Flu: Recommended   Tdap:    Rhogam: AB Positive    Sterilization:none     Anatomy US:Growth 77%, anterior placenta, 3VC, all anatomy seen and WNL  Follow up US: growth 88%, HC 94%, vertex, anterior placenta-grade1        2. Supervision of other normal pregnancy, antepartum  -     POC Urinalysis Dipstick    3. Class 1 obesity due to excess calories without serious comorbidity with body mass index (BMI) of 33.0 to 33.9 in adult    4. Hypothyroidism, unspecified type  Assessment & Plan:  Euthyrox 50mcg daily   12/9-TSH WNL               Counseling:   OB precautions, leaking, VB, nori mcwilliams vs PTL/Labor  HTN precautions, HA, vision change, RUQ/epigastric pain, edema  Round Ligament Pain:  The uterus has several ligaments which provide support and keep the uterus in place. As the  uterus grows these ligaments are pulled and stretched which often causes sharp stabbing like pain in the inguinal area.   You may find a pregnancy support band helpful. Changing positions may also help. Yoga is a great way to cope with round ligament and low back pain in pregnancy.    Massage may also help with low back pain   Things to Consider at this Point in your Pregnancy:  Some women experience swelling in their feet during pregnancy. Compression stockings may help  Drink plenty of water and stay active   Make sure you are eating frequent small meals, nuts are a wonderful snack to keep with you            Return in about 2 weeks (around 1/13/2023) for Routine OB visit.      We have gone over prenatal care to include the timing and content of visits. I informed her how to contact the office and/or on call person in the event of any problems and encouraged her to do so when she feels it is necessary.  We then spent time answering her questions which she indicated were answered to her satisfaction.    Precious  DO Kelly  12/30/2022 16:49 EST

## 2023-01-12 ENCOUNTER — PATIENT OUTREACH (OUTPATIENT)
Dept: LABOR AND DELIVERY | Facility: HOSPITAL | Age: 23
End: 2023-01-12
Payer: COMMERCIAL

## 2023-01-12 ENCOUNTER — TELEPHONE (OUTPATIENT)
Dept: OBSTETRICS AND GYNECOLOGY | Facility: CLINIC | Age: 23
End: 2023-01-12

## 2023-01-12 ENCOUNTER — ROUTINE PRENATAL (OUTPATIENT)
Dept: OBSTETRICS AND GYNECOLOGY | Facility: CLINIC | Age: 23
End: 2023-01-12
Payer: COMMERCIAL

## 2023-01-12 VITALS — SYSTOLIC BLOOD PRESSURE: 118 MMHG | DIASTOLIC BLOOD PRESSURE: 76 MMHG | WEIGHT: 208 LBS | BODY MASS INDEX: 36.85 KG/M2

## 2023-01-12 DIAGNOSIS — Z34.80 SUPERVISION OF OTHER NORMAL PREGNANCY, ANTEPARTUM: Primary | ICD-10-CM

## 2023-01-12 DIAGNOSIS — Z98.890 HISTORY OF FEMALE STERILIZATION: ICD-10-CM

## 2023-01-12 DIAGNOSIS — E66.09 CLASS 1 OBESITY DUE TO EXCESS CALORIES WITHOUT SERIOUS COMORBIDITY WITH BODY MASS INDEX (BMI) OF 33.0 TO 33.9 IN ADULT: ICD-10-CM

## 2023-01-12 LAB
GLUCOSE UR STRIP-MCNC: NEGATIVE MG/DL
PROT UR STRIP-MCNC: NEGATIVE MG/DL

## 2023-01-12 PROCEDURE — 99213 OFFICE O/P EST LOW 20 MIN: CPT | Performed by: OBSTETRICS & GYNECOLOGY

## 2023-01-12 RX ORDER — NITROFURANTOIN 25; 75 MG/1; MG/1
100 CAPSULE ORAL 2 TIMES DAILY
Status: ON HOLD | COMMUNITY
Start: 2023-01-09 | End: 2023-02-19

## 2023-01-12 RX ORDER — PROMETHAZINE HYDROCHLORIDE 25 MG/1
25 TABLET ORAL EVERY 6 HOURS PRN
COMMUNITY
Start: 2023-01-09 | End: 2023-03-30

## 2023-01-12 NOTE — TELEPHONE ENCOUNTER
Caller: ROLA GAONA    Relationship to patient: SELF    Best call back number: [phone]    Patient is needing:     PT IS HAVING TO REPLACE A TIRE AND NEEDS TO SEE SOMEONE TODAY BUT POSSIBLY A LATER APPT    SHE STATED ANY DOCTOR WOULD BE FINE    SHE WAS RECENTLY IN THE ER DUE TO CONTRACTIONS AND WANTED TO REITERATE THAT SHE NEEDS TO BE SEEN TODAY      HUB ATTEMPTED WT

## 2023-01-12 NOTE — PROGRESS NOTES
Chief Complaint:  Scheduled OB visit    HPI: 22 y.o.  at 33w2d   Positive baby movement  History of shoulder dystocia    Vitals:    23 1511   BP: 118/76   Weight: 94.3 kg (208 lb)       See OB flowsheet also for pregnancy related data.    A/P  Intrauterine pregnancy at 33w2d   Diagnoses and all orders for this visit:    1. Supervision of other normal pregnancy, antepartum (Primary)  -     POC Urinalysis Dipstick    2. Class 1 obesity due to excess calories without serious comorbidity with body mass index (BMI) of 33.0 to 33.9 in adult  Overview:  BMI 35, plan  testing 35 weeks       3. History of female sterilization  Pregnancy has occurred after sterilization with Filshie clips.    Glucola was normal at 93.  No history of gestational diabetes.    Continue prenatal vitamins.  Discussed normal Glucola result  Encouraged fetal kick counts, 10 movements in 2 hours every day.  To labor and delivery if lack fetal movement    PLAN:   Return in about 2 weeks (around 2023) for Visit in 2 weeks with Dr. Mendoza.    Issa Asencio Sr., MD  2023 15:43 EST

## 2023-01-12 NOTE — OUTREACH NOTE
[facility]  Check-In    Current Estimated Gestational Age: 33w2d    Questions/Answers    Flowsheet Row Responses   Best Method for Contacting Cell   Demographics Reviewed Yes   Currently Employed No   Able to keep appointments as scheduled Yes   Gender(s) and Name(s) Girl - Artemio   Baby Active/Feeling Fetal Movemen Yes   How are you presently feeling? Stressed   Questions regarding prenatal visits or tests to be ordered? No   Resource/Environmental Concerns Financial, Reliable Transportation, Other   Other Concerns Food securities, having difficulty with SNAP application   Do you have any questions related to your care experience, your pregnancy, plans for delivery, any concerns, etc? No          Met with patient and  at office visit. Verbalizes multiple stressors. Housing difficulty, has looked in to [facility] housing but unable to get. Does not want government housing due to feeling that there is more exposure to illegal substances there. Was seen at hospital in [facility] recently and had a negative experience. Was dehydrated. States unable to work due to pregnancy at this time but was wishing she could so that they could have more income than his disability. Currently living with family but more family is moving in. Has had trouble with flat tires on car, has had fixed and is able to get reliable rides from friends. States food insecurities due to SNAP has returned their application and needs to be redone. States is able to get food for her and infant. Mentioned Warm Blessings to them, states they are unable to go due to them living in [facility] and having to pay for gas to get there.  states patient is having anxiety and is taking CBD/THC supplements to help. Patient and  well versed in resources available to them but I will ensure if any further resources located that they will be notified. Encouraged to reach out for any questions, needs or concerns.     Valentina Bedolla,  RN  Maternity Nurse Navigator    1/12/2023, 16:51 EST

## 2023-01-18 ENCOUNTER — LAB (OUTPATIENT)
Dept: OBSTETRICS AND GYNECOLOGY | Facility: CLINIC | Age: 23
End: 2023-01-18
Payer: COMMERCIAL

## 2023-01-18 DIAGNOSIS — Z34.80 SUPERVISION OF OTHER NORMAL PREGNANCY, ANTEPARTUM: Primary | ICD-10-CM

## 2023-01-18 PROCEDURE — 82239 BILE ACIDS TOTAL: CPT | Performed by: OBSTETRICS & GYNECOLOGY

## 2023-01-18 PROCEDURE — 80053 COMPREHEN METABOLIC PANEL: CPT | Performed by: OBSTETRICS & GYNECOLOGY

## 2023-01-19 LAB
ALBUMIN SERPL-MCNC: 3.5 G/DL (ref 3.5–5.2)
ALBUMIN/GLOB SERPL: 1.3 G/DL
ALP SERPL-CCNC: 109 U/L (ref 39–117)
ALT SERPL W P-5'-P-CCNC: 46 U/L (ref 1–33)
ANION GAP SERPL CALCULATED.3IONS-SCNC: 7 MMOL/L (ref 5–15)
AST SERPL-CCNC: 28 U/L (ref 1–32)
BILIRUB SERPL-MCNC: 0.2 MG/DL (ref 0–1.2)
BUN SERPL-MCNC: 6 MG/DL (ref 6–20)
BUN/CREAT SERPL: 12.8 (ref 7–25)
CALCIUM SPEC-SCNC: 9.1 MG/DL (ref 8.6–10.5)
CHLORIDE SERPL-SCNC: 103 MMOL/L (ref 98–107)
CO2 SERPL-SCNC: 25 MMOL/L (ref 22–29)
CREAT SERPL-MCNC: 0.47 MG/DL (ref 0.57–1)
EGFRCR SERPLBLD CKD-EPI 2021: 136.5 ML/MIN/1.73
GLOBULIN UR ELPH-MCNC: 2.8 GM/DL
GLUCOSE SERPL-MCNC: 87 MG/DL (ref 65–99)
POTASSIUM SERPL-SCNC: 4.1 MMOL/L (ref 3.5–5.2)
PROT SERPL-MCNC: 6.3 G/DL (ref 6–8.5)
SODIUM SERPL-SCNC: 135 MMOL/L (ref 136–145)

## 2023-01-21 LAB — BILE AC SERPL-SCNC: 5.5 UMOL/L (ref 0–10)

## 2023-01-23 DIAGNOSIS — E03.9 HYPOTHYROIDISM, UNSPECIFIED TYPE: Primary | ICD-10-CM

## 2023-01-23 RX ORDER — LEVOTHYROXINE SODIUM 50 UG/1
50 TABLET ORAL DAILY
Qty: 90 TABLET | Refills: 1 | Status: SHIPPED | OUTPATIENT
Start: 2023-01-23

## 2023-01-23 NOTE — TELEPHONE ENCOUNTER
Please tell patient I have sent in the prescription, however she is due for lab recheck.  I have placed an order for TSH and free T4, she needs to have this done

## 2023-01-23 NOTE — TELEPHONE ENCOUNTER
----- Message from Carmina Son sent at 1/23/2023 12:02 PM EST -----  Regarding: Euthyrox phase out  Contact: [phone]  So none of my local pharmacies keep euthyrox in stock. I'm thinking it would be best to stick with [facility] pharmacy and maybe try to get the prescription in a little early each time so they have time to order it? I currently have 8 days worth left.

## 2023-01-27 ENCOUNTER — ROUTINE PRENATAL (OUTPATIENT)
Dept: OBSTETRICS AND GYNECOLOGY | Facility: CLINIC | Age: 23
End: 2023-01-27
Payer: COMMERCIAL

## 2023-01-27 VITALS — BODY MASS INDEX: 37.55 KG/M2 | DIASTOLIC BLOOD PRESSURE: 76 MMHG | WEIGHT: 212 LBS | SYSTOLIC BLOOD PRESSURE: 129 MMHG

## 2023-01-27 DIAGNOSIS — Z34.80 SUPERVISION OF OTHER NORMAL PREGNANCY, ANTEPARTUM: Primary | ICD-10-CM

## 2023-01-27 DIAGNOSIS — O26.849 UTERINE SIZE DATE DISCREPANCY PREGNANCY: ICD-10-CM

## 2023-01-27 DIAGNOSIS — E03.9 HYPOTHYROIDISM, UNSPECIFIED TYPE: ICD-10-CM

## 2023-01-27 LAB
GLUCOSE UR STRIP-MCNC: NEGATIVE MG/DL
PROT UR STRIP-MCNC: NEGATIVE MG/DL
T4 FREE SERPL-MCNC: 0.8 NG/DL (ref 0.93–1.7)
TSH SERPL DL<=0.05 MIU/L-ACNC: 1.81 UIU/ML (ref 0.27–4.2)

## 2023-01-27 PROCEDURE — 84443 ASSAY THYROID STIM HORMONE: CPT | Performed by: OBSTETRICS & GYNECOLOGY

## 2023-01-27 PROCEDURE — 84439 ASSAY OF FREE THYROXINE: CPT | Performed by: OBSTETRICS & GYNECOLOGY

## 2023-01-27 PROCEDURE — 87081 CULTURE SCREEN ONLY: CPT | Performed by: OBSTETRICS & GYNECOLOGY

## 2023-01-27 PROCEDURE — 85025 COMPLETE CBC W/AUTO DIFF WBC: CPT | Performed by: OBSTETRICS & GYNECOLOGY

## 2023-01-27 PROCEDURE — 99214 OFFICE O/P EST MOD 30 MIN: CPT | Performed by: OBSTETRICS & GYNECOLOGY

## 2023-01-27 NOTE — PROGRESS NOTES
Routine Prenatal Visit     Subjective  Carmina Son is a 22 y.o.  at 35w3d here for her routine OB visit.   She is taking her prenatal vitamins.Reports no loss of fluid or vaginal bleeding. Patient doing well without any complaints. Pregnancy complicated by:     Patient Active Problem List   Diagnosis   • Hypothyroidism   • Class 1 obesity due to excess calories without serious comorbidity with body mass index (BMI) of 33.0 to 33.9 in adult   • Encounter for supervision of high risk pregnancy with grand multiparity, antepartum   • Vertigo   • History of female sterilization         OB History    Para Term  AB Living   3 2 2     1   SAB IAB Ectopic Molar Multiple Live Births             1      # Outcome Date GA Lbr Trino/2nd Weight Sex Delivery Anes PTL Lv   3 Current            2 Term 21   4848 g (10 lb 11 oz)  Vag-Spont   ROSELYN      Complications: Shoulder Dystocia   1 Term 17     Vag-Spont              ROS:   General ROS: negative for - chills or fatigue  Respiratory ROS: negative for - cough or hemoptysis  Cardiovascular ROS: negative for - chest pain or dyspnea on exertion  Genito-Urinary ROS: negative for  change in urinary stream, vaginal discharge   Musculoskeletal ROS: negative for - gait disturbance or joint pain  Dermatological ROS: negative for acne,  dry skin or itching    Objective  Physical Exam:   Vitals:    23 1307   BP: 129/76       Uterine Size: size greater than dates  FHT: 110-160 BPM    General appearance - alert, well appearing, and in no distress  Mental status - alert, oriented to person, place, and time  Abdomen- Soft, Gravid uterus, non-tender to palpation  Musculoskeletal: negative for - gait disturbance or joint pain  Extremeties: negative swelling or cyanosis   Dermatological: negative rashes or skin lesions   Pelvic exam: GBS RV swab performed today    Assessment/Plan:   Diagnoses and all orders for this visit:    1. Supervision of other normal  pregnancy, antepartum (Primary)  -     POC Urinalysis Dipstick  -     CBC & Differential; Future  -     Strep Gp B Culture+Rfx ([facility]) - Swab, Vaginal/Rectum    2. Hypothyroidism, unspecified type  -     TSH; Future  -     T4, free; Future    3. Uterine size date discrepancy pregnancy  -     US Ob 14 + Weeks Single or First Gestation; Future            Counseling:   OB precautions, leaking, VB, nori mcwilliams vs PTL/Labor  FKC  HTN precautions, HA, vision change, RUQ/epigastric pain, edema  Round Ligament Pain:  The uterus has several ligaments which provide support and keep the uterus in place. As the  uterus grows these ligaments are pulled and stretched which often causes sharp stabbing like pain in the inguinal area.   You may find a pregnancy support band helpful. Changing positions may also help. Yoga is a great way to cope with round ligament and low back pain in pregnancy.    Massage may also help with low back pain   Things to Consider at this Point in your Pregnancy:  Some women experience swelling in their feet during pregnancy. Compression stockings may help  Drink plenty of water and stay active   Make sure you are eating frequent small meals, nuts are a wonderful snack to keep with you            Return in about 1 week (around 2/3/2023) for Routine OB visit.      We have gone over prenatal care to include the timing and content of visits. I informed her how to contact the office and/or on call person in the event of any problems and encouraged her to do so when she feels it is necessary.  We then spent time answering her questions which she indicated were answered to her satisfaction.    Precious Mendoza,   1/27/2023 13:28 EST

## 2023-01-28 LAB
BASOPHILS # BLD AUTO: 0.04 10*3/MM3 (ref 0–0.2)
BASOPHILS NFR BLD AUTO: 0.3 % (ref 0–1.5)
DEPRECATED RDW RBC AUTO: 45.6 FL (ref 37–54)
EOSINOPHIL # BLD AUTO: 0.07 10*3/MM3 (ref 0–0.4)
EOSINOPHIL NFR BLD AUTO: 0.6 % (ref 0.3–6.2)
ERYTHROCYTE [DISTWIDTH] IN BLOOD BY AUTOMATED COUNT: 14.6 % (ref 12.3–15.4)
HCT VFR BLD AUTO: 34.1 % (ref 34–46.6)
HGB BLD-MCNC: 11.4 G/DL (ref 12–15.9)
IMM GRANULOCYTES # BLD AUTO: 0.11 10*3/MM3 (ref 0–0.05)
IMM GRANULOCYTES NFR BLD AUTO: 0.9 % (ref 0–0.5)
LYMPHOCYTES # BLD AUTO: 1.48 10*3/MM3 (ref 0.7–3.1)
LYMPHOCYTES NFR BLD AUTO: 12.1 % (ref 19.6–45.3)
MCH RBC QN AUTO: 28.8 PG (ref 26.6–33)
MCHC RBC AUTO-ENTMCNC: 33.4 G/DL (ref 31.5–35.7)
MCV RBC AUTO: 86.1 FL (ref 79–97)
MONOCYTES # BLD AUTO: 0.77 10*3/MM3 (ref 0.1–0.9)
MONOCYTES NFR BLD AUTO: 6.3 % (ref 5–12)
NEUTROPHILS NFR BLD AUTO: 79.8 % (ref 42.7–76)
NEUTROPHILS NFR BLD AUTO: 9.78 10*3/MM3 (ref 1.7–7)
NRBC BLD AUTO-RTO: 0 /100 WBC (ref 0–0.2)
PLATELET # BLD AUTO: 230 10*3/MM3 (ref 140–450)
PMV BLD AUTO: 10.8 FL (ref 6–12)
RBC # BLD AUTO: 3.96 10*6/MM3 (ref 3.77–5.28)
WBC NRBC COR # BLD: 12.25 10*3/MM3 (ref 3.4–10.8)

## 2023-01-29 LAB — BACTERIA SPEC AEROBE CULT: NORMAL

## 2023-01-31 ENCOUNTER — TELEPHONE (OUTPATIENT)
Dept: FAMILY MEDICINE CLINIC | Facility: CLINIC | Age: 23
End: 2023-01-31
Payer: COMMERCIAL

## 2023-01-31 DIAGNOSIS — E03.9 HYPOTHYROIDISM, UNSPECIFIED TYPE: Primary | ICD-10-CM

## 2023-01-31 NOTE — TELEPHONE ENCOUNTER
----- Message from Magdalena Warner MA sent at 1/30/2023  7:06 AM EST -----  Regarding: FW: Euthyrox phase out  Contact: [phone]  TSH/T4 done 01/27 by [facility]   ----- Message -----  From: Carmina Son  Sent: 1/27/2023   2:09 PM EST  To: [facility]  Subject: Euthyrox phase out                               They actually checked again today. I asked if they could have it sent over to you.

## 2023-02-03 ENCOUNTER — ROUTINE PRENATAL (OUTPATIENT)
Dept: OBSTETRICS AND GYNECOLOGY | Facility: CLINIC | Age: 23
End: 2023-02-03
Payer: COMMERCIAL

## 2023-02-03 VITALS — DIASTOLIC BLOOD PRESSURE: 76 MMHG | SYSTOLIC BLOOD PRESSURE: 124 MMHG | WEIGHT: 214.2 LBS | BODY MASS INDEX: 37.94 KG/M2

## 2023-02-03 DIAGNOSIS — Z34.80 SUPERVISION OF OTHER NORMAL PREGNANCY, ANTEPARTUM: Primary | ICD-10-CM

## 2023-02-03 DIAGNOSIS — E03.9 HYPOTHYROIDISM, UNSPECIFIED TYPE: ICD-10-CM

## 2023-02-03 DIAGNOSIS — E66.09 CLASS 1 OBESITY DUE TO EXCESS CALORIES WITHOUT SERIOUS COMORBIDITY WITH BODY MASS INDEX (BMI) OF 33.0 TO 33.9 IN ADULT: ICD-10-CM

## 2023-02-03 DIAGNOSIS — O09.40 ENCOUNTER FOR SUPERVISION OF HIGH RISK PREGNANCY WITH GRAND MULTIPARITY, ANTEPARTUM: ICD-10-CM

## 2023-02-03 PROCEDURE — 99213 OFFICE O/P EST LOW 20 MIN: CPT | Performed by: OBSTETRICS & GYNECOLOGY

## 2023-02-03 NOTE — PROGRESS NOTES
Routine Prenatal Visit     Subjective  Carmina Son is a 22 y.o.  at 36w3d here for her routine OB visit.   She is taking her prenatal vitamins.Reports no loss of fluid or vaginal bleeding. Patient doing well without any complaints. Pregnancy complicated by:     Patient Active Problem List   Diagnosis   • Hypothyroidism   • Class 1 obesity due to excess calories without serious comorbidity with body mass index (BMI) of 33.0 to 33.9 in adult   • Encounter for supervision of high risk pregnancy with grand multiparity, antepartum   • Vertigo   • History of female sterilization         OB History    Para Term  AB Living   3 2 2     1   SAB IAB Ectopic Molar Multiple Live Births             1      # Outcome Date GA Lbr Trino/2nd Weight Sex Delivery Anes PTL Lv   3 Current            2 Term 21   4848 g (10 lb 11 oz)  Vag-Spont   ROSELYN      Complications: Shoulder Dystocia   1 Term 17     Vag-Spont              ROS:   General ROS: negative for - chills or fatigue  Respiratory ROS: negative for - cough or hemoptysis  Cardiovascular ROS: negative for - chest pain or dyspnea on exertion  Genito-Urinary ROS: negative for  change in urinary stream, vaginal discharge   Musculoskeletal ROS: negative for - gait disturbance or joint pain  Dermatological ROS: negative for acne,  dry skin or itching    Objective  Physical Exam:   Vitals:    23 0834   BP: 124/76       Uterine Size: size equals dates  FHT: 110-160 BPM    General appearance - alert, well appearing, and in no distress  Mental status - alert, oriented to person, place, and time  Abdomen- Soft, Gravid uterus, non-tender to palpation  Musculoskeletal: negative for - gait disturbance or joint pain  Extremeties: negative swelling or cyanosis   Dermatological: negative rashes or skin lesions       Assessment/Plan:   Diagnoses and all orders for this visit:    1. Supervision of other normal pregnancy, antepartum (Primary)  -     POC  Urinalysis Dipstick    2. Encounter for supervision of high risk pregnancy with grand multiparity, antepartum  Assessment & Plan:  SIRI finalize:Estimated Date of Delivery: 2/28/23      Genetic testing (NIPS-Quad)/CF/AFP:  Declined   COVID:   Flu:   Tdap:    Rhogam: AB Positive    Sterilization:none     Anatomy US:Growth 77%, anterior placenta, 3VC, all anatomy seen and WNL  Follow up US: growth 88%, HC 94%, vertex, anterior placenta-grade1    PLAN/PROB:  Hypothyroidism-50mcg synthroid  H/O shoulder dystocia- G2- 10lb 11oz      3. Hypothyroidism, unspecified type  Assessment & Plan:  Euthyrox 50mcg daily   1/27TSH WNL       4. Class 1 obesity due to excess calories without serious comorbidity with body mass index (BMI) of 33.0 to 33.9 in adult          Counseling:   OB precautions, leaking, VB, nori mcwilliams vs PTL/Labor  FKC  HTN precautions, HA, vision change, RUQ/epigastric pain, edema  Round Ligament Pain:  The uterus has several ligaments which provide support and keep the uterus in place. As the  uterus grows these ligaments are pulled and stretched which often causes sharp stabbing like pain in the inguinal area.   You may find a pregnancy support band helpful. Changing positions may also help. Yoga is a great way to cope with round ligament and low back pain in pregnancy.    Massage may also help with low back pain   Things to Consider at this Point in your Pregnancy:  Some women experience swelling in their feet during pregnancy. Compression stockings may help  Drink plenty of water and stay active   Make sure you are eating frequent small meals, nuts are a wonderful snack to keep with you            Return in about 1 week (around 2/10/2023) for Routine OB visit.      We have gone over prenatal care to include the timing and content of visits. I informed her how to contact the office and/or on call person in the event of any problems and encouraged her to do so when she feels it is necessary.  We then spent  time answering her questions which she indicated were answered to her satisfaction.    Precious Mendoza, DO  2/3/2023 08:41 EST

## 2023-02-03 NOTE — ASSESSMENT & PLAN NOTE
SIRI finalize:Estimated Date of Delivery: 2/28/23      Genetic testing (NIPS-Quad)/CF/AFP:  Declined   COVID:   Flu:   Tdap:    Rhogam: AB Positive    Sterilization:none     Anatomy US:Growth 77%, anterior placenta, 3VC, all anatomy seen and WNL  Follow up US: growth 88%, HC 94%, vertex, anterior placenta-grade1    F/U Growth US- 74%, EFW 6lbs 15oz, vertex   PLAN/PROB:  Hypothyroidism-50mcg synthroid  H/O shoulder dystocia- G2- 10lb 11oz

## 2023-02-09 ENCOUNTER — LAB (OUTPATIENT)
Dept: LAB | Facility: HOSPITAL | Age: 23
End: 2023-02-09
Payer: COMMERCIAL

## 2023-02-09 ENCOUNTER — ROUTINE PRENATAL (OUTPATIENT)
Dept: OBSTETRICS AND GYNECOLOGY | Facility: CLINIC | Age: 23
End: 2023-02-09
Payer: COMMERCIAL

## 2023-02-09 VITALS — WEIGHT: 218 LBS | SYSTOLIC BLOOD PRESSURE: 114 MMHG | BODY MASS INDEX: 38.62 KG/M2 | DIASTOLIC BLOOD PRESSURE: 66 MMHG

## 2023-02-09 DIAGNOSIS — E66.09 CLASS 1 OBESITY DUE TO EXCESS CALORIES WITHOUT SERIOUS COMORBIDITY WITH BODY MASS INDEX (BMI) OF 33.0 TO 33.9 IN ADULT: ICD-10-CM

## 2023-02-09 DIAGNOSIS — O09.40 ENCOUNTER FOR SUPERVISION OF HIGH RISK PREGNANCY WITH GRAND MULTIPARITY, ANTEPARTUM: Primary | ICD-10-CM

## 2023-02-09 DIAGNOSIS — Z98.890 HISTORY OF FEMALE STERILIZATION: ICD-10-CM

## 2023-02-09 DIAGNOSIS — Z34.80 SUPERVISION OF OTHER NORMAL PREGNANCY, ANTEPARTUM: ICD-10-CM

## 2023-02-09 DIAGNOSIS — E03.9 HYPOTHYROIDISM, UNSPECIFIED TYPE: ICD-10-CM

## 2023-02-09 LAB
GLUCOSE UR STRIP-MCNC: NEGATIVE MG/DL
PROT UR STRIP-MCNC: NEGATIVE MG/DL
T4 FREE SERPL-MCNC: 0.79 NG/DL (ref 0.93–1.7)
T4 SERPL-MCNC: 9.15 MCG/DL (ref 4.5–11.7)
TSH SERPL DL<=0.05 MIU/L-ACNC: 2.05 UIU/ML (ref 0.27–4.2)

## 2023-02-09 PROCEDURE — 36415 COLL VENOUS BLD VENIPUNCTURE: CPT

## 2023-02-09 PROCEDURE — 99214 OFFICE O/P EST MOD 30 MIN: CPT | Performed by: OBSTETRICS & GYNECOLOGY

## 2023-02-09 PROCEDURE — 84439 ASSAY OF FREE THYROXINE: CPT

## 2023-02-09 PROCEDURE — 84443 ASSAY THYROID STIM HORMONE: CPT

## 2023-02-09 NOTE — PROGRESS NOTES
Routine Prenatal Visit     Subjective  Carmina Son is a 22 y.o.  at 37w2d here for her routine OB visit.   She is taking her prenatal vitamins.Reports no loss of fluid or vaginal bleeding. Patient doing well without any complaints. Pregnancy complicated by:     Patient Active Problem List   Diagnosis   • Hypothyroidism   • Class 1 obesity due to excess calories without serious comorbidity with body mass index (BMI) of 33.0 to 33.9 in adult   • Encounter for supervision of high risk pregnancy with grand multiparity, antepartum   • Vertigo   • History of female sterilization         OB History    Para Term  AB Living   3 2 2     1   SAB IAB Ectopic Molar Multiple Live Births             1      # Outcome Date GA Lbr Trino/2nd Weight Sex Delivery Anes PTL Lv   3 Current            2 Term 21   4848 g (10 lb 11 oz)  Vag-Spont   ROSELYN      Complications: Shoulder Dystocia   1 Term 17     Vag-Spont              ROS:   General ROS: negative for - chills or fatigue  Respiratory ROS: negative for - cough or hemoptysis  Cardiovascular ROS: negative for - chest pain or dyspnea on exertion  Genito-Urinary ROS: negative for  change in urinary stream, vaginal discharge   Musculoskeletal ROS: negative for - gait disturbance or joint pain  Dermatological ROS: negative for acne,  dry skin or itching    Objective  Physical Exam:   Vitals:    23 1600   BP: 114/66       Uterine Size: size equals dates  FHT: 110-160 BPM    General appearance - alert, well appearing, and in no distress  Mental status - alert, oriented to person, place, and time  Abdomen- Soft, Gravid uterus, non-tender to palpation  Musculoskeletal: negative for - gait disturbance or joint pain  Extremeties: negative swelling or cyanosis   Dermatological: negative rashes or skin lesions     Assessment/Plan:   Diagnoses and all orders for this visit:    1. Encounter for supervision of high risk pregnancy with grand multiparity,  antepartum (Primary)  Assessment & Plan:  SIRI finalize:Estimated Date of Delivery: 2/28/23      Genetic testing (NIPS-Quad)/CF/AFP:  Declined   COVID: Recommended  Flu: Recommended  Tdap:script given     Rhogam: AB Positive    Sterilization:none     Anatomy US:Growth 77%, anterior placenta, 3VC, all anatomy seen and WNL  Follow up US: growth 88%, HC 94%, vertex, anterior placenta-grade1  F/U Growth US- 74%, EFW 6lbs 15oz, vertex     PLAN/PROB:  Hypothyroidism-50mcg synthroid  H/O shoulder dystocia- G2- 10lb 11oz      2. Supervision of other normal pregnancy, antepartum  -     POC Urinalysis Dipstick    3. Class 1 obesity due to excess calories without serious comorbidity with body mass index (BMI) of 33.0 to 33.9 in adult    4. Hypothyroidism, unspecified type  Assessment & Plan:  Euthyrox 50mcg daily   12/9-TSH WNL       5. History of female sterilization          Counseling:   OB precautions, leaking, VB, nori mcwilliams vs PTL/Labor  FKC  HTN precautions, HA, vision change, RUQ/epigastric pain, edema  Round Ligament Pain:  The uterus has several ligaments which provide support and keep the uterus in place. As the  uterus grows these ligaments are pulled and stretched which often causes sharp stabbing like pain in the inguinal area.   You may find a pregnancy support band helpful. Changing positions may also help. Yoga is a great way to cope with round ligament and low back pain in pregnancy.    Massage may also help with low back pain   Things to Consider at this Point in your Pregnancy:  Some women experience swelling in their feet during pregnancy. Compression stockings may help  Drink plenty of water and stay active   Make sure you are eating frequent small meals, nuts are a wonderful snack to keep with you            Return in about 1 week (around 2/16/2023) for Routine OB visit.      We have gone over prenatal care to include the timing and content of visits. I informed her how to contact the office and/or on   in the event of any problems and encouraged her to do so when she feels it is necessary.  We then spent time answering her questions which she indicated were answered to her satisfaction.    Precious Mendoza DO  2/9/2023 16:37 EST

## 2023-02-09 NOTE — ASSESSMENT & PLAN NOTE
SIRI finalize:Estimated Date of Delivery: 2/28/23      Genetic testing (NIPS-Quad)/CF/AFP:  Declined   COVID: Recommended  Flu: Recommended  Tdap:script given     Rhogam: AB Positive    Sterilization:none     Anatomy US:Growth 77%, anterior placenta, 3VC, all anatomy seen and WNL  Follow up US: growth 88%, HC 94%, vertex, anterior placenta-grade1  F/U Growth US- 74%, EFW 6lbs 15oz, vertex     PLAN/PROB:  Hypothyroidism-50mcg synthroid  H/O shoulder dystocia- G2- 10lb 11oz

## 2023-02-19 ENCOUNTER — ANESTHESIA EVENT (OUTPATIENT)
Dept: LABOR AND DELIVERY | Facility: HOSPITAL | Age: 23
End: 2023-02-19
Payer: COMMERCIAL

## 2023-02-19 ENCOUNTER — HOSPITAL ENCOUNTER (INPATIENT)
Facility: HOSPITAL | Age: 23
LOS: 2 days | Discharge: HOME | End: 2023-02-21
Attending: OBSTETRICS & GYNECOLOGY | Admitting: OBSTETRICS & GYNECOLOGY
Payer: COMMERCIAL

## 2023-02-19 ENCOUNTER — ANESTHESIA (OUTPATIENT)
Dept: LABOR AND DELIVERY | Facility: HOSPITAL | Age: 23
End: 2023-02-19
Payer: COMMERCIAL

## 2023-02-19 PROBLEM — Z37.9 NORMAL LABOR: Status: ACTIVE | Noted: 2023-02-19

## 2023-02-19 LAB
ABO GROUP BLD: NORMAL
AMPHET+METHAMPHET UR QL: NEGATIVE
BARBITURATES UR QL SCN: NEGATIVE
BASOPHILS # BLD AUTO: 0.03 10*3/MM3 (ref 0–0.2)
BASOPHILS NFR BLD AUTO: 0.3 % (ref 0–1.5)
BENZODIAZ UR QL SCN: NEGATIVE
BLD GP AB SCN SERPL QL: NEGATIVE
CANNABINOIDS SERPL QL: NEGATIVE
COCAINE UR QL: NEGATIVE
DEPRECATED RDW RBC AUTO: 46.3 FL (ref 37–54)
EOSINOPHIL # BLD AUTO: 0.07 10*3/MM3 (ref 0–0.4)
EOSINOPHIL NFR BLD AUTO: 0.6 % (ref 0.3–6.2)
ERYTHROCYTE [DISTWIDTH] IN BLOOD BY AUTOMATED COUNT: 15.2 % (ref 12.3–15.4)
HCT VFR BLD AUTO: 35.5 % (ref 34–46.6)
HGB BLD-MCNC: 11.5 G/DL (ref 12–15.9)
IMM GRANULOCYTES # BLD AUTO: 0.09 10*3/MM3 (ref 0–0.05)
IMM GRANULOCYTES NFR BLD AUTO: 0.8 % (ref 0–0.5)
LYMPHOCYTES # BLD AUTO: 1.4 10*3/MM3 (ref 0.7–3.1)
LYMPHOCYTES NFR BLD AUTO: 12.5 % (ref 19.6–45.3)
MCH RBC QN AUTO: 27.4 PG (ref 26.6–33)
MCHC RBC AUTO-ENTMCNC: 32.4 G/DL (ref 31.5–35.7)
MCV RBC AUTO: 84.7 FL (ref 79–97)
METHADONE UR QL SCN: NEGATIVE
MONOCYTES # BLD AUTO: 0.78 10*3/MM3 (ref 0.1–0.9)
MONOCYTES NFR BLD AUTO: 6.9 % (ref 5–12)
NEUTROPHILS NFR BLD AUTO: 78.9 % (ref 42.7–76)
NEUTROPHILS NFR BLD AUTO: 8.86 10*3/MM3 (ref 1.7–7)
NRBC BLD AUTO-RTO: 0 /100 WBC (ref 0–0.2)
OPIATES UR QL: NEGATIVE
OXYCODONE UR QL SCN: NEGATIVE
PLATELET # BLD AUTO: 250 10*3/MM3 (ref 140–450)
PMV BLD AUTO: 10.6 FL (ref 6–12)
RBC # BLD AUTO: 4.19 10*6/MM3 (ref 3.77–5.28)
RH BLD: POSITIVE
T&S EXPIRATION DATE: NORMAL
WBC NRBC COR # BLD: 11.23 10*3/MM3 (ref 3.4–10.8)

## 2023-02-19 PROCEDURE — 0HQ9XZZ REPAIR PERINEUM SKIN, EXTERNAL APPROACH: ICD-10-PCS | Performed by: OBSTETRICS & GYNECOLOGY

## 2023-02-19 PROCEDURE — 25010000002 ROPIVACAINE PER 1 MG: Performed by: ANESTHESIOLOGY

## 2023-02-19 PROCEDURE — 80307 DRUG TEST PRSMV CHEM ANLYZR: CPT | Performed by: OBSTETRICS & GYNECOLOGY

## 2023-02-19 PROCEDURE — 86850 RBC ANTIBODY SCREEN: CPT | Performed by: OBSTETRICS & GYNECOLOGY

## 2023-02-19 PROCEDURE — C1755 CATHETER, INTRASPINAL: HCPCS | Performed by: ANESTHESIOLOGY

## 2023-02-19 PROCEDURE — 85025 COMPLETE CBC W/AUTO DIFF WBC: CPT | Performed by: OBSTETRICS & GYNECOLOGY

## 2023-02-19 PROCEDURE — 25010000002 FENTANYL CITRATE (PF) 50 MCG/ML SOLUTION: Performed by: ANESTHESIOLOGY

## 2023-02-19 PROCEDURE — 86901 BLOOD TYPING SEROLOGIC RH(D): CPT | Performed by: OBSTETRICS & GYNECOLOGY

## 2023-02-19 PROCEDURE — 25010000002 HYDROMORPHONE 1 MG/ML SOLUTION: Performed by: OBSTETRICS & GYNECOLOGY

## 2023-02-19 PROCEDURE — 10907ZC DRAINAGE OF AMNIOTIC FLUID, THERAPEUTIC FROM PRODUCTS OF CONCEPTION, VIA NATURAL OR ARTIFICIAL OPENING: ICD-10-PCS | Performed by: OBSTETRICS & GYNECOLOGY

## 2023-02-19 PROCEDURE — 86900 BLOOD TYPING SEROLOGIC ABO: CPT | Performed by: OBSTETRICS & GYNECOLOGY

## 2023-02-19 RX ORDER — SODIUM CHLORIDE, SODIUM LACTATE, POTASSIUM CHLORIDE, CALCIUM CHLORIDE 600; 310; 30; 20 MG/100ML; MG/100ML; MG/100ML; MG/100ML
125 INJECTION, SOLUTION INTRAVENOUS CONTINUOUS
Status: DISCONTINUED | OUTPATIENT
Start: 2023-02-19 | End: 2023-02-21 | Stop reason: HOSPADM

## 2023-02-19 RX ORDER — FENTANYL CITRATE 50 UG/ML
INJECTION, SOLUTION INTRAMUSCULAR; INTRAVENOUS
Status: COMPLETED
Start: 2023-02-19 | End: 2023-02-19

## 2023-02-19 RX ORDER — ACETAMINOPHEN 325 MG/1
650 TABLET ORAL EVERY 4 HOURS PRN
Status: DISCONTINUED | OUTPATIENT
Start: 2023-02-19 | End: 2023-02-20 | Stop reason: HOSPADM

## 2023-02-19 RX ORDER — CARBOPROST TROMETHAMINE 250 UG/ML
250 INJECTION, SOLUTION INTRAMUSCULAR AS NEEDED
Status: DISCONTINUED | OUTPATIENT
Start: 2023-02-19 | End: 2023-02-20 | Stop reason: HOSPADM

## 2023-02-19 RX ORDER — FENTANYL CITRATE 50 UG/ML
INJECTION, SOLUTION INTRAMUSCULAR; INTRAVENOUS
Status: COMPLETED | OUTPATIENT
Start: 2023-02-19 | End: 2023-02-19

## 2023-02-19 RX ORDER — LIDOCAINE HYDROCHLORIDE 10 MG/ML
INJECTION, SOLUTION EPIDURAL; INFILTRATION; INTRACAUDAL; PERINEURAL
Status: DISCONTINUED
Start: 2023-02-19 | End: 2023-02-19 | Stop reason: WASHOUT

## 2023-02-19 RX ORDER — TRISODIUM CITRATE DIHYDRATE AND CITRIC ACID MONOHYDRATE 500; 334 MG/5ML; MG/5ML
30 SOLUTION ORAL ONCE AS NEEDED
Status: DISCONTINUED | OUTPATIENT
Start: 2023-02-19 | End: 2023-02-20 | Stop reason: HOSPADM

## 2023-02-19 RX ORDER — ROPIVACAINE HYDROCHLORIDE 2 MG/ML
INJECTION, SOLUTION EPIDURAL; INFILTRATION; PERINEURAL
Status: COMPLETED
Start: 2023-02-19 | End: 2023-02-19

## 2023-02-19 RX ORDER — OXYTOCIN/0.9 % SODIUM CHLORIDE 30/500 ML
999 PLASTIC BAG, INJECTION (ML) INTRAVENOUS ONCE
Status: DISCONTINUED | OUTPATIENT
Start: 2023-02-19 | End: 2023-02-21 | Stop reason: HOSPADM

## 2023-02-19 RX ORDER — ONDANSETRON 2 MG/ML
4 INJECTION INTRAMUSCULAR; INTRAVENOUS EVERY 6 HOURS PRN
Status: DISCONTINUED | OUTPATIENT
Start: 2023-02-19 | End: 2023-02-20 | Stop reason: HOSPADM

## 2023-02-19 RX ORDER — LIDOCAINE HYDROCHLORIDE 10 MG/ML
5 INJECTION, SOLUTION EPIDURAL; INFILTRATION; INTRACAUDAL; PERINEURAL AS NEEDED
Status: DISCONTINUED | OUTPATIENT
Start: 2023-02-19 | End: 2023-02-20 | Stop reason: HOSPADM

## 2023-02-19 RX ORDER — LIDOCAINE HYDROCHLORIDE AND EPINEPHRINE 15; 5 MG/ML; UG/ML
INJECTION, SOLUTION EPIDURAL
Status: COMPLETED | OUTPATIENT
Start: 2023-02-19 | End: 2023-02-19

## 2023-02-19 RX ORDER — MISOPROSTOL 200 UG/1
800 TABLET ORAL AS NEEDED
Status: DISCONTINUED | OUTPATIENT
Start: 2023-02-19 | End: 2023-02-20 | Stop reason: HOSPADM

## 2023-02-19 RX ORDER — ONDANSETRON 4 MG/1
4 TABLET, FILM COATED ORAL EVERY 6 HOURS PRN
Status: DISCONTINUED | OUTPATIENT
Start: 2023-02-19 | End: 2023-02-20 | Stop reason: HOSPADM

## 2023-02-19 RX ORDER — IBUPROFEN 600 MG/1
600 TABLET ORAL EVERY 6 HOURS PRN
Status: DISCONTINUED | OUTPATIENT
Start: 2023-02-19 | End: 2023-02-20 | Stop reason: HOSPADM

## 2023-02-19 RX ORDER — SODIUM CHLORIDE 0.9 % (FLUSH) 0.9 %
10 SYRINGE (ML) INJECTION AS NEEDED
Status: DISCONTINUED | OUTPATIENT
Start: 2023-02-19 | End: 2023-02-20 | Stop reason: HOSPADM

## 2023-02-19 RX ORDER — OXYTOCIN/0.9 % SODIUM CHLORIDE 30/500 ML
250 PLASTIC BAG, INJECTION (ML) INTRAVENOUS CONTINUOUS
Status: ACTIVE | OUTPATIENT
Start: 2023-02-20 | End: 2023-02-20

## 2023-02-19 RX ORDER — OXYTOCIN/0.9 % SODIUM CHLORIDE 30/500 ML
PLASTIC BAG, INJECTION (ML) INTRAVENOUS
Status: COMPLETED
Start: 2023-02-19 | End: 2023-02-19

## 2023-02-19 RX ORDER — METHYLERGONOVINE MALEATE 0.2 MG/ML
200 INJECTION INTRAVENOUS ONCE AS NEEDED
Status: DISCONTINUED | OUTPATIENT
Start: 2023-02-19 | End: 2023-02-20 | Stop reason: HOSPADM

## 2023-02-19 RX ORDER — ROPIVACAINE HYDROCHLORIDE 2 MG/ML
INJECTION, SOLUTION EPIDURAL; INFILTRATION; PERINEURAL
Status: COMPLETED | OUTPATIENT
Start: 2023-02-19 | End: 2023-02-19

## 2023-02-19 RX ORDER — TERBUTALINE SULFATE 1 MG/ML
0.25 INJECTION, SOLUTION SUBCUTANEOUS AS NEEDED
Status: DISCONTINUED | OUTPATIENT
Start: 2023-02-19 | End: 2023-02-20 | Stop reason: HOSPADM

## 2023-02-19 RX ORDER — EPHEDRINE SULFATE 50 MG/ML
5 INJECTION, SOLUTION INTRAVENOUS
Status: DISCONTINUED | OUTPATIENT
Start: 2023-02-19 | End: 2023-02-20 | Stop reason: HOSPADM

## 2023-02-19 RX ORDER — SODIUM CHLORIDE 0.9 % (FLUSH) 0.9 %
3 SYRINGE (ML) INJECTION EVERY 12 HOURS SCHEDULED
Status: DISCONTINUED | OUTPATIENT
Start: 2023-02-19 | End: 2023-02-20 | Stop reason: HOSPADM

## 2023-02-19 RX ADMIN — SODIUM CHLORIDE, POTASSIUM CHLORIDE, SODIUM LACTATE AND CALCIUM CHLORIDE 125 ML/HR: 600; 310; 30; 20 INJECTION, SOLUTION INTRAVENOUS at 19:54

## 2023-02-19 RX ADMIN — FENTANYL CITRATE 100 MCG: 50 INJECTION, SOLUTION INTRAMUSCULAR; INTRAVENOUS at 19:23

## 2023-02-19 RX ADMIN — HYDROMORPHONE HYDROCHLORIDE 0.5 MG: 1 INJECTION, SOLUTION INTRAMUSCULAR; INTRAVENOUS; SUBCUTANEOUS at 17:19

## 2023-02-19 RX ADMIN — ROPIVACAINE HYDROCHLORIDE 5 ML: 2 INJECTION, SOLUTION EPIDURAL; INFILTRATION at 19:23

## 2023-02-19 RX ADMIN — Medication 10 ML/HR: at 19:26

## 2023-02-19 RX ADMIN — LIDOCAINE HYDROCHLORIDE AND EPINEPHRINE 3 ML: 15; 5 INJECTION, SOLUTION EPIDURAL at 19:20

## 2023-02-19 RX ADMIN — Medication 30 UNITS: at 20:55

## 2023-02-19 RX ADMIN — SODIUM CHLORIDE, POTASSIUM CHLORIDE, SODIUM LACTATE AND CALCIUM CHLORIDE 125 ML/HR: 600; 310; 30; 20 INJECTION, SOLUTION INTRAVENOUS at 14:18

## 2023-02-20 DIAGNOSIS — E03.9 HYPOTHYROIDISM, UNSPECIFIED TYPE: Primary | ICD-10-CM

## 2023-02-20 RX ORDER — CARBOPROST TROMETHAMINE 250 UG/ML
250 INJECTION, SOLUTION INTRAMUSCULAR ONCE
Status: DISCONTINUED | OUTPATIENT
Start: 2023-02-20 | End: 2023-02-20

## 2023-02-20 RX ORDER — SODIUM CHLORIDE 0.9 % (FLUSH) 0.9 %
1-10 SYRINGE (ML) INJECTION AS NEEDED
Status: DISCONTINUED | OUTPATIENT
Start: 2023-02-20 | End: 2023-02-21 | Stop reason: HOSPADM

## 2023-02-20 RX ORDER — BISACODYL 10 MG
10 SUPPOSITORY, RECTAL RECTAL DAILY PRN
Status: DISCONTINUED | OUTPATIENT
Start: 2023-02-21 | End: 2023-02-21 | Stop reason: HOSPADM

## 2023-02-20 RX ORDER — DOCUSATE SODIUM 100 MG/1
100 CAPSULE, LIQUID FILLED ORAL 2 TIMES DAILY
Status: DISCONTINUED | OUTPATIENT
Start: 2023-02-20 | End: 2023-02-21 | Stop reason: HOSPADM

## 2023-02-20 RX ORDER — MISOPROSTOL 200 UG/1
200 TABLET ORAL
Status: DISCONTINUED | OUTPATIENT
Start: 2023-02-20 | End: 2023-02-20

## 2023-02-20 RX ORDER — HYDROCODONE BITARTRATE AND ACETAMINOPHEN 10; 325 MG/1; MG/1
1 TABLET ORAL EVERY 4 HOURS PRN
Status: DISCONTINUED | OUTPATIENT
Start: 2023-02-20 | End: 2023-02-21 | Stop reason: HOSPADM

## 2023-02-20 RX ORDER — CALCIUM CARBONATE 200(500)MG
2 TABLET,CHEWABLE ORAL 3 TIMES DAILY PRN
Status: DISCONTINUED | OUTPATIENT
Start: 2023-02-20 | End: 2023-02-21 | Stop reason: HOSPADM

## 2023-02-20 RX ORDER — HYDROCODONE BITARTRATE AND ACETAMINOPHEN 5; 325 MG/1; MG/1
1 TABLET ORAL EVERY 4 HOURS PRN
Status: DISCONTINUED | OUTPATIENT
Start: 2023-02-20 | End: 2023-02-21 | Stop reason: HOSPADM

## 2023-02-20 RX ORDER — IBUPROFEN 800 MG/1
800 TABLET ORAL EVERY 8 HOURS PRN
Status: DISCONTINUED | OUTPATIENT
Start: 2023-02-20 | End: 2023-02-21 | Stop reason: HOSPADM

## 2023-02-20 RX ORDER — ACETAMINOPHEN 325 MG/1
650 TABLET ORAL EVERY 6 HOURS PRN
Status: DISCONTINUED | OUTPATIENT
Start: 2023-02-20 | End: 2023-02-21 | Stop reason: HOSPADM

## 2023-02-20 RX ADMIN — DOCUSATE SODIUM 100 MG: 100 CAPSULE, LIQUID FILLED ORAL at 21:52

## 2023-02-20 RX ADMIN — WITCH HAZEL 1 PAD: 500 SOLUTION RECTAL; TOPICAL at 00:05

## 2023-02-20 RX ADMIN — ACETAMINOPHEN 650 MG: 325 TABLET ORAL at 21:52

## 2023-02-20 RX ADMIN — Medication: at 00:05

## 2023-02-20 RX ADMIN — IBUPROFEN 800 MG: 800 TABLET, FILM COATED ORAL at 04:22

## 2023-02-20 RX ADMIN — DOCUSATE SODIUM 100 MG: 100 CAPSULE, LIQUID FILLED ORAL at 09:09

## 2023-02-20 RX ADMIN — ACETAMINOPHEN 650 MG: 325 TABLET ORAL at 09:09

## 2023-02-20 RX ADMIN — IBUPROFEN 800 MG: 800 TABLET, FILM COATED ORAL at 15:45

## 2023-02-20 NOTE — ANESTHESIA PROCEDURE NOTES
Labor Epidural      Patient reassessed immediately prior to procedure    Patient location during procedure: OB  Start Time: 2/19/2023 7:12 PM  Performed By  Anesthesiologist: Denice Rome MD  Preanesthetic Checklist  Completed: patient identified, IV checked, risks and benefits discussed, surgical consent, monitors and equipment checked, pre-op evaluation and timeout performed  Prep:  Pt Position:sitting  Sterile Tech:cap, gloves, sterile barrier, mask and gown  Prep:chlorhexidine gluconate and isopropyl alcohol  Monitoring:blood pressure monitoring, continuous pulse oximetry and EKG  Epidural Block Procedure:  Approach:midline  Guidance:landmark technique and palpation technique  Location:L3-L4  Needle Type:Tuohy  Needle Gauge:17 G  Loss of Resistance Medium: air  Cath Depth at skin:6 cm  Paresthesia: none  Aspiration:negative  Test Dose:negative  Medication: ropivacaine (NAROPIN) 0.2 % injection - Injection   5 mL - 2/19/2023 7:23:00 PM  fentaNYL citrate (PF) (SUBLIMAZE) injection - Epidural   100 mcg - 2/19/2023 7:23:00 PM  lidocaine 1.5%-EPINEPHrine 1:200,000 (XYLOCAINE W/EPI) injection - Epidural   3 mL - 2/19/2023 7:20:00 PM  Number of Attempts: 1  Post Assessment:  Dressing:occlusive dressing applied and secured with tape  Pt Tolerance:patient tolerated the procedure well with no apparent complications  Complications:no

## 2023-02-20 NOTE — ANESTHESIA PREPROCEDURE EVALUATION
Anesthesia Evaluation     Patient summary reviewed and Nursing notes reviewed   no history of anesthetic complications:  NPO Solid Status: > 8 hours  NPO Liquid Status: > 2 hours           Airway   Mallampati: II  TM distance: >3 FB  Neck ROM: full  No difficulty expected  Dental      Pulmonary - normal exam    breath sounds clear to auscultation  (+) a smoker Former,   Cardiovascular - negative cardio ROS and normal exam  Exercise tolerance: good (4-7 METS)    Rhythm: regular  Rate: normal        Neuro/Psych  (+) headaches, dizziness/light headedness, psychiatric history Anxiety and Depression,    GI/Hepatic/Renal/Endo    (+) obesity,  GERD well controlled,  thyroid problem hypothyroidism    Musculoskeletal (-) negative ROS    Abdominal    Substance History - negative use     OB/GYN    (+) Pregnant,         Other - negative ROS       ROS/Med Hx Other:                    Anesthesia Plan    ASA 2     epidural       Anesthetic plan, risks, benefits, and alternatives have been provided, discussed and informed consent has been obtained with: patient.        CODE STATUS:    Level Of Support Discussed With: Patient  Code Status (Patient has no pulse and is not breathing): CPR (Attempt to Resuscitate)  Medical Interventions (Patient has pulse or is breathing): Full

## 2023-02-20 NOTE — ANESTHESIA POSTPROCEDURE EVALUATION
Patient: Carmina Son    Procedure Summary     Date: 02/19/23 Room / Location:     Anesthesia Start: 1912 Anesthesia Stop: 2049    Procedure: LABOR ANALGESIA Diagnosis:     Scheduled Providers:  Provider: Denice Rome MD    Anesthesia Type: epidural ASA Status: 2          Anesthesia Type: epidural    Vitals  Vitals Value Taken Time   /54 02/20/23 0545   Temp 37 °C (98.6 °F) 02/20/23 0545   Pulse 97 02/20/23 0545   Resp 18 02/20/23 0545   SpO2 98 % 02/20/23 0005           Post Anesthesia Care and Evaluation    Patient location during evaluation: bedside  Patient participation: complete - patient participated  Level of consciousness: awake  Pain management: adequate    Airway patency: patent  Anesthetic complications: No anesthetic complications  PONV Status: none  Cardiovascular status: acceptable and stable  Respiratory status: acceptable  Hydration status: acceptable  Post Neuraxial Block status: Motor and sensory function returned to baseline and No signs or symptoms of PDPH  Comments: An Anesthesiologist personally participated in the most demanding procedures (including induction and emergence if applicable) in the anesthesia plan, monitored the course of anesthesia administration at frequent intervals and remained physically present and available for immediate diagnosis and treatment of emergencies.

## 2023-02-21 VITALS
OXYGEN SATURATION: 98 % | RESPIRATION RATE: 16 BRPM | TEMPERATURE: 98.3 F | BODY MASS INDEX: 38.62 KG/M2 | SYSTOLIC BLOOD PRESSURE: 134 MMHG | HEART RATE: 81 BPM | HEIGHT: 63 IN | DIASTOLIC BLOOD PRESSURE: 72 MMHG

## 2023-02-21 LAB
HCT VFR BLD AUTO: 30.9 % (ref 34–46.6)
HGB BLD-MCNC: 9.8 G/DL (ref 12–15.9)

## 2023-02-21 PROCEDURE — 85018 HEMOGLOBIN: CPT | Performed by: OBSTETRICS & GYNECOLOGY

## 2023-02-21 PROCEDURE — 85014 HEMATOCRIT: CPT | Performed by: OBSTETRICS & GYNECOLOGY

## 2023-02-21 RX ADMIN — DOCUSATE SODIUM 100 MG: 100 CAPSULE, LIQUID FILLED ORAL at 08:18

## 2023-02-21 RX ADMIN — WITCH HAZEL 1 PAD: 500 SOLUTION RECTAL; TOPICAL at 08:41

## 2023-02-21 RX ADMIN — IBUPROFEN 800 MG: 800 TABLET, FILM COATED ORAL at 04:34

## 2023-02-21 RX ADMIN — ACETAMINOPHEN 650 MG: 325 TABLET ORAL at 11:45

## 2023-02-25 ENCOUNTER — TELEPHONE (OUTPATIENT)
Dept: LACTATION | Facility: HOSPITAL | Age: 23
End: 2023-02-25
Payer: COMMERCIAL

## 2023-02-27 ENCOUNTER — TELEPHONE (OUTPATIENT)
Dept: OBSTETRICS AND GYNECOLOGY | Facility: CLINIC | Age: 23
End: 2023-02-27
Payer: COMMERCIAL

## 2023-02-27 RX ORDER — ESCITALOPRAM OXALATE 10 MG/1
10 TABLET ORAL DAILY
Qty: 30 TABLET | Refills: 0 | Status: SHIPPED | OUTPATIENT
Start: 2023-02-27

## 2023-02-27 NOTE — TELEPHONE ENCOUNTER
Patient called back stating this started a few days ago. It's not all day but has several episodes of feeling like that throughout the day. She denies any SI/HI. She had been on depression medication in the past but can not remember what it was. She Does have a visit with her therapist 3/2 and will follow up with them also. She is requesting to start a medication to help until then. She has her PP visit here 3/27. Please advise.

## 2023-02-27 NOTE — TELEPHONE ENCOUNTER
Patient advised of Dr. Chavez's response. She states that her therapist office has someone on staff that is able to prescribe medication. She understands to follow up with them on any medication/dosage changes. She voiced understanding to go tot the ER if any SI/HI. She will keep her scheduled PP visit with our office.

## 2023-02-27 NOTE — TELEPHONE ENCOUNTER
----- Message from Carmina Adelaida sent at 2/25/2023  8:17 PM EST -----  Regarding: PPD  Contact: [phone]  I'm starting to worry about postpartum depression. But it's not a constant feeling. I keep getting waves of depression. No real reason for it but I just feel down, not wanting to eat, and crying for no apparent reason. I've also been having obsessive thoughts about awful things happening to the kids. Especially sids. Like, logically I know these things are highly unlikely but for whatever reason I stress over keeping them safe more than normal. It's making it really hard for me to sleep too because every five seconds I'm turning over to make sure they're ok.

## 2023-03-02 ENCOUNTER — PATIENT OUTREACH (OUTPATIENT)
Dept: LABOR AND DELIVERY | Facility: HOSPITAL | Age: 23
End: 2023-03-02
Payer: COMMERCIAL

## 2023-03-02 NOTE — OUTREACH NOTE
Motherhood Connection  Postpartum Check-In    Questions/Answers    Flowsheet Row Responses   Visit Setting Telephone   Best Method for Contacting Cell   OB Discharge Note Reviewed  Reviewed   OB Discharge Navigator Reviewed  Reviewed   OB Discharge Medications Reviewed  Reviewed   [place] discharged home with mother? Yes   Current Pain Levels 0-10 4   At Rest Pain Levels 0-10 4   Pain level with activity 0-10 4   Acceptable Pain Level 0-10 4   Pain Location Abdomen   Pain Description Intermittent   Verbalized Emotional State Acceptance   Family/Support Network Family, Significant Other, Spouse   Level of Involvement in Care Attentive, Interactive   Do you feel comfortable in your relationship with your baby? Yes   Have members of your household adjusted to your baby? Yes   Is the baby's father supportive and/or involved with the baby? Yes   How does your partner feel about the baby? Happy, Involved   Do you feel safe at home, school and work? Yes   Are you in a relationship with someone who threatens you or hurts you? No   Do you have the resources to keep yourself and your baby healthy and safe? Yes   Lochia (per patient report) Similar to Menstrual Flow   Amount Scant   Number of pads per day 2   Lochia Odor None   Is patient breastfeeding? No   Postpartum Depression Screening Education Education Provided   Doctor Appointments: Education Provided   Breastfeeding Education Education Provided   Family Planning Education Education Provided   Postpartum Care Education Education Provided   S & S to report Education Provided   Followup Appointments Made Yes   Well Child Visit Appointments Made Yes   Did you complete the visit? Yes   Were there any specific concerns? No   Umbilical Cord No reported signs or symptoms   Infant Feeding Method Formula   Formula Type Other   Other Formula Sim sensitive   Formula PO (mL) 2oz   Formula/Expressed Milk frequency of feedings: 1-3 hours   Number of wet diapers x 24 hours 8   What  safe sleep surface is available? Bassinet, Pack N Play   Are there stuffed animals, toys, pillows, quilts, blankets, wedges, positioners, bumpers or other loose bedding in the infant's sleeping environment? No   Where does the baby usually sleep? Bassinet, Pack N Play   Does the baby ever share a sleep surface with a sibling, adult or pet? No   Does the baby ever share a sleep surface in a bed, couch, recliner or other? No   What position do you place your baby to sleep for naps? Back   What position do you place your baby to sleep at night Back   Are you and/or other caregivers smoking inside or outside the baby's home? No   Is the infant dressed appropiately for the temperature of the home? Yes   Do you use a clean, dry pacifier that is not attached to a string or stuffed animal? Yes          Review of Systems    [place]  Depression Score: 4 (2023  5:00 PM)      Doing well. C/O intermittent generalized abdominal pain for past 2-3 days, encouraged to reach out to OB if continues. Has started Lexapro. No other questions, needs or concerns.     Valentina Santos RN  Maternity Nurse Navigator    3/2/2023, 13:21 EST

## 2023-03-09 ENCOUNTER — PATIENT OUTREACH (OUTPATIENT)
Dept: CALL CENTER | Facility: HOSPITAL | Age: 23
End: 2023-03-09
Payer: COMMERCIAL

## 2023-03-09 NOTE — OUTREACH NOTE
Motherhood Connection Survey    Flowsheet Row Responses   [facility] patient discharged from? [facility]   Week 1 attempt successful? No   Unsuccessful attempts Attempt 1   Reschedule Today            Frieda DHALIWAL - Licensed Nurse

## 2023-03-09 NOTE — OUTREACH NOTE
Motherhood Connection Survey    Flowsheet Row Responses   [facility] patient discharged from? [facility]   Week 1 attempt successful? Yes   Call end time [id number]   Baby sex Girl   [place] discharged home with mother? Yes   Baby sex Girl   Delivery type Vaginal   Emotional state Acceptance   Family support Yes   Do you have all necessary resources to care for you and your baby?  Yes   Have members of your household adjusted to your baby? Yes   Did you have any problems with pre-eclampsia during this pregnancy? No   Did you have blood glucose issues during this pregnancy No   Lochia amount Light   Lochia per patient report Serosa   Did you have an episiotomy/tear/abdominal incision? Yes   Feeding Method Bottle   Frequency EVERY 3-4 HOURS   Amount 3-4 OUNCES   Breast Condition No   Nipple Condition No   Signs baby is ready to eat Crying   Feeding tolerance Wet/dirty diapers, Weight gain   Number of wet diapers x 24 hours 8   Last BM x 24 hours 2   Umbilical Cord No reported signs or symptoms   Where does the baby usually sleep? Bassinet   Are there stuffed animals, toys, pillows, quilts, blankets, wedges, positioners, bumpers or other loose bedding in the infant's sleeping environment? No   Does the baby ever share a sleep surface in a bed, couch, recliner or other? No   What position do you lay your baby down to sleep? Back   Are you and/or other caregivers smoking inside or outside the baby's home? No   Mom appointment comments: PATIENT HAS SEEN HER OBGYN   Baby appointment comments: BABY HAS BEEN SEEN BY THE PEDIATRICIAN   Call completed? Yes   How satisfied were you with the Motherhood Connection Program? 5            Frieda DHALIWAL - Licensed Nurse

## 2023-03-28 ENCOUNTER — TELEPHONE (OUTPATIENT)
Dept: FAMILY MEDICINE CLINIC | Facility: CLINIC | Age: 23
End: 2023-03-28
Payer: COMMERCIAL

## 2023-03-29 ENCOUNTER — TELEPHONE (OUTPATIENT)
Dept: FAMILY MEDICINE CLINIC | Facility: CLINIC | Age: 23
End: 2023-03-29
Payer: COMMERCIAL

## 2023-03-29 NOTE — PROGRESS NOTES
"POSTPARTUM Follow Up Visit    CC: Postpartum FU  Chief Complaint   Patient presents with   • Postpartum Care     Wants tubal        HPI:    • Antepartum or Postpartum complications: none  • Date of delivery: 23  • Delivery type:            Perineum: Vaginal, 1st degree laceration  • Delivering Provider:   [facility] Hospitalist/Laborist      • Feeding: Bottle  • Pain:  Yes and having abdominal pain when she eats, occasional cramping  • Vaginal Bleeding:  No  • Depressed/Anxious:  No and Did call for prescription to restart Lexapro, has not started as she is feeling better  EPDS score: 9   #10: 0  • Plans for BC:  Bilateral salpingectomy  • Last pap date and result: 22, ASCUS, neg HPV      PHYSICAL EXAM:  /74   Pulse 82   Ht 160 cm (62.99\")   Wt 90.4 kg (199 lb 3.2 oz)   LMP 2022   Breastfeeding No   BMI 35.30 kg/m²  19.5 kg (43 lb)  General- NAD, alert and oriented, appropriate  Psych- Normal mood, good memory    Abdomen- Soft, non distended, non tender, no masses    Declined pelvic exam today  Ext- No edema, no cyanosis   Skin- No lesions, no rashes    ASSESSMENT AND PLAN:  Diagnoses and all orders for this visit:    1. Postpartum follow-up (Primary)      Counseling:    • May resume intercourse  • May resume normal activities  • Core strengthening exercises reviewed and recommended  • Ok to return to work/school  • Will schedule consult for bilateral salpingectomy        Follow Up:  Return for Consult for bilateral salpingectomy  - first available.          Dwain Wagner, APRN  2023    [facility]  [facility]  [address] [place] [address]  Dept: [phone]  Loc: [phone]   "

## 2023-03-30 ENCOUNTER — POSTPARTUM VISIT (OUTPATIENT)
Dept: OBSTETRICS AND GYNECOLOGY | Facility: CLINIC | Age: 23
End: 2023-03-30
Payer: COMMERCIAL

## 2023-03-30 VITALS
DIASTOLIC BLOOD PRESSURE: 74 MMHG | SYSTOLIC BLOOD PRESSURE: 110 MMHG | BODY MASS INDEX: 35.3 KG/M2 | HEART RATE: 82 BPM | HEIGHT: 63 IN | WEIGHT: 199.2 LBS

## 2023-03-30 PROBLEM — O09.40 ENCOUNTER FOR SUPERVISION OF HIGH RISK PREGNANCY WITH GRAND MULTIPARITY, ANTEPARTUM: Status: RESOLVED | Noted: 2022-10-14 | Resolved: 2023-03-30

## 2023-03-30 PROBLEM — Z37.9 NORMAL LABOR: Status: RESOLVED | Noted: 2023-02-19 | Resolved: 2023-03-30

## 2023-04-06 ENCOUNTER — LAB (OUTPATIENT)
Dept: LAB | Facility: HOSPITAL | Age: 23
End: 2023-04-06
Payer: COMMERCIAL

## 2023-04-06 ENCOUNTER — OFFICE VISIT (OUTPATIENT)
Dept: OBSTETRICS AND GYNECOLOGY | Facility: CLINIC | Age: 23
End: 2023-04-06
Payer: COMMERCIAL

## 2023-04-06 ENCOUNTER — PREP FOR SURGERY (OUTPATIENT)
Dept: OTHER | Facility: HOSPITAL | Age: 23
End: 2023-04-06
Payer: COMMERCIAL

## 2023-04-06 VITALS
SYSTOLIC BLOOD PRESSURE: 112 MMHG | DIASTOLIC BLOOD PRESSURE: 63 MMHG | WEIGHT: 195.8 LBS | HEIGHT: 63 IN | BODY MASS INDEX: 34.69 KG/M2 | HEART RATE: 72 BPM

## 2023-04-06 DIAGNOSIS — Z30.2 ENCOUNTER FOR STERILIZATION: Primary | ICD-10-CM

## 2023-04-06 DIAGNOSIS — E03.9 HYPOTHYROIDISM, UNSPECIFIED TYPE: ICD-10-CM

## 2023-04-06 LAB
T4 FREE SERPL-MCNC: 1.08 NG/DL (ref 0.93–1.7)
T4 SERPL-MCNC: 7.73 MCG/DL (ref 4.5–11.7)
TSH SERPL DL<=0.05 MIU/L-ACNC: 2.33 UIU/ML (ref 0.27–4.2)

## 2023-04-06 PROCEDURE — 99213 OFFICE O/P EST LOW 20 MIN: CPT | Performed by: OBSTETRICS & GYNECOLOGY

## 2023-04-06 PROCEDURE — 1160F RVW MEDS BY RX/DR IN RCRD: CPT | Performed by: OBSTETRICS & GYNECOLOGY

## 2023-04-06 PROCEDURE — 36415 COLL VENOUS BLD VENIPUNCTURE: CPT

## 2023-04-06 PROCEDURE — 1159F MED LIST DOCD IN RCRD: CPT | Performed by: OBSTETRICS & GYNECOLOGY

## 2023-04-06 PROCEDURE — 84439 ASSAY OF FREE THYROXINE: CPT

## 2023-04-06 PROCEDURE — 84443 ASSAY THYROID STIM HORMONE: CPT

## 2023-04-06 RX ORDER — SODIUM CHLORIDE 0.9 % (FLUSH) 0.9 %
1-10 SYRINGE (ML) INJECTION AS NEEDED
Status: CANCELLED | OUTPATIENT
Start: 2023-04-06

## 2023-04-06 RX ORDER — ACETAMINOPHEN 500 MG
1000 TABLET ORAL ONCE
Status: CANCELLED | OUTPATIENT
Start: 2023-04-06 | End: 2023-04-06

## 2023-04-06 RX ORDER — SODIUM CHLORIDE 9 MG/ML
40 INJECTION, SOLUTION INTRAVENOUS AS NEEDED
Status: CANCELLED | OUTPATIENT
Start: 2023-04-06

## 2023-04-06 RX ORDER — SODIUM CHLORIDE 0.9 % (FLUSH) 0.9 %
3 SYRINGE (ML) INJECTION EVERY 12 HOURS SCHEDULED
Status: CANCELLED | OUTPATIENT
Start: 2023-04-06

## 2023-04-06 NOTE — PROGRESS NOTES
"GYN Visit    Chief Complaint   Patient presents with   • Follow-up     Disc tubal       HPI:   22 y.o. LMP: Patient's last menstrual period was 2022. Here to discuss sterilization with bilateral salpingectomy. She has had filshie clips placed before that failed. She would like a complete bilateral salpingectomy. She was counseled that this is non-reversible and she can no longer have children naturally. She understands and wishes to proceed, she is done with childbearing. Risks and benefits and alternatives of surgery were discussed with patient.  Risks are not limited to anesthesia, bleeding, blood transfusion, infection, damage to surrounding organs, wound separation, re-operation, thromboembolic disease, death. She denies any complaints today.     Social History     Substance and Sexual Activity   Sexual Activity Yes   • Partners: Male   • Birth control/protection: None           History: PMHx, Meds, Allergies, PSHx, Social Hx, and POBHx all reviewed and updated.    PHYSICAL EXAM:  /63   Pulse 72   Ht 160 cm (62.99\")   Wt 88.8 kg (195 lb 12.8 oz)   LMP 2022   Breastfeeding No Comment: Formula  BMI 34.70 kg/m²   General- NAD, alert and oriented, appropriate  Psych- Normal mood, good memory    Neck- No masses, no thyroid enlargement  Cardiovascular- Regular rhythm, no murnurs  Respiratory- CTA to bases, no wheezes  Abdomen- Soft, non distended, non tender, no masses      ASSESSMENT AND PLAN:  Diagnoses and all orders for this visit:    1. Encounter for sterilization (Primary)  Overview:  Added automatically from request for surgery [id number]       -Operative laparoscopy with bilateral salpingectomy    Follow Up:  Return in about 2 weeks (around 2023) for Post op visit.    I spent 20 minutes caring for Carmina on this date of service. This time includes time spent by me in the following activities:preparing for the visit, reviewing tests, obtaining and/or reviewing a separately " obtained history, performing a medically appropriate examination and/or evaluation  and ordering medications, tests, or procedures    Precious Mendoza DO  04/06/2023    [facility] [place] [place] [facility]  [address] [place] [address]  Dept: [phone]  Dept Fax: [phone]  Loc: [phone]  Loc Fax: [phone]

## 2023-04-07 NOTE — PRE-PROCEDURE INSTRUCTIONS
IMPORTANT INSTRUCTIONS - PRE-ADMISSION TESTING  1. DO NOT EAT OR CHEW anything after midnight the night before your procedure.    2. You may have CLEAR liquids up to 2_ hours prior to ARRIVAL time.   3. Take the following medications the morning of your procedure with JUST A SIP OF WATER: LEXAPRO IF NEEDED, EUTHRYOX    4. DO NOT BRING your medications to the hospital with you, UNLESS something has changed since your PRE-Admission Testing appointment.  5. Hold all vitamins, supplements, and NSAIDS (Non- steroidal anti-inflammatory meds) for one week prior to surgery (you MAY take Tylenol or Acetaminophen).  6. If you are diabetic, check your blood sugar the morning of your procedure. If it is less than 70 or if you are feeling symptomatic, call the following number for further instructions: [phone]  7. Use your inhalers/nebulizers as usual, the morning of your procedure. BRING YOUR INHALERS with you.   8. Bring your CPAP or BIPAP to hospital, ONLY IF YOU WILL BE SPENDING THE NIGHT.   9. Make sure you have a ride home and have someone who will stay with you the day of your procedure after you go home.  10. If you have any questions, please call your Pre-Admission Testing NurseBENITEZ_ at [phone]__.   Per anesthesia request, do not smoke for 24 hours before your procedure or as instructed by your surgeon.  ••••••Clear Liquid Diet        Find out when you need to start a clear liquid diet.   Think of “clear liquids” as anything you could read a newspaper through. This includes things like water, broth, sports drinks, or tea WITHOUT any kind of milk or cream.           Once you are told to start a clear liquid diet, only drink these things until 2 hours before arrival to the hospital or when the hospital says to stop. Total volume limitation: 8 oz.       Clear liquids you CAN drink:   Water   Clear broth: beef, chicken, vegetable, or bone broth with nothing in it   Gatorade   Lemonade or Christopher-aid   Soda    Tea, coffee (NO cream or honey)   Jell-O (without fruit)   Popsicles (without fruit or cream)   Italian ices   Juice without pulp: apple, white, grape   You may use salt, pepper, and sugar    Do NOT drink:   Milk or cream   Soy milk, almond milk, coconut milk, or other non-dairy drinks and   creamers   Milkshakes or smoothies   Tomato juice   Orange juice   Grapefruit juice   Cream soups or any other than broth         Clear Liquid Diet:  Do NOT eat any solid food.  Do NOT eat or suck on mints or candy.  Do NOT chew gum.  Do NOT drink thick liquids like milk or juice with pulp in it.  Do NOT add milk, cream, or anything like soy milk or almond milk to coffee or tea.   11.

## 2023-04-11 NOTE — NURSING NOTE
Patient was called as a phone screen. Per Charis/Kelly it is ok for patient to do labs morning of surgery.  Patient informed, verbalized understanding

## 2023-04-13 NOTE — H&P
GYN HISTORY & PHYSICAL      Patient Name: Carmina Son  : 2000  MRN: [id number]    Subjective     Chief Complaint: Sterilization     HPI:  22 y.o.  Patient's last menstrual period was 2022.. Patient here today to undergo operative laparoscopy with bilateral salpingectomy.She was counseled that this is non-reversible and she can no longer have children naturally. She understands and wishes to proceed, she is done with childbearing. Risks and benefits and alternatives of surgery were discussed with patient.  Risks are not limited to anesthesia, bleeding, blood transfusion, infection, damage to surrounding organs, wound separation, re-operation, thromboembolic disease, death. She denies any complaints today. She wishes to proceed with the above procedure. She denies any F/C, D/C, N/V, CP or SOB.         ROS: Review of Systems   Constitutional: Negative.    HENT: Negative.    Eyes: Negative.    Respiratory: Negative.    Cardiovascular: Negative.    Gastrointestinal: Negative.    Endocrine: Negative.    Genitourinary: Negative.    Musculoskeletal: Negative.    Skin: Negative.    Allergic/Immunologic: Negative.    Neurological: Negative.    Hematological: Negative.    Psychiatric/Behavioral: Negative.          Personal History     PMHx:    Past Medical History:   Diagnosis Date   • Anemia    • Anxiety    • Depression    • Encounter for sterilization    • Hypothyroidism    • Trauma        OBHx:   OB History    Para Term  AB Living   3 3 3     3   SAB IAB Ectopic Molar Multiple Live Births             3      # Outcome Date GA Lbr Trino/2nd Weight Sex Delivery Anes PTL Lv   3 Term 23 38w5d / 168:31 4320 g (9 lb 8.4 oz) F Vag-Spont EPI N ROSELYN      Complications: Shoulder Dystocia   2 Term 21 38w0d  4848 g (10 lb 11 oz)  Vag-Spont EPI  ROSELYN      Complications: Shoulder Dystocia   1 Term 17 37w0d  3771 g (8 lb 5 oz) M Vag-Spont   ROSELYN        Home Medications:  Apple Cider  Vinegar, Prenatal Multivit-Min-Fe-FA, escitalopram, and levothyroxine    Allergies:  No Known Allergies    PSHx:   Past Surgical History:   Procedure Laterality Date   • CHOLECYSTECTOMY     • COLONOSCOPY     • COLONOSCOPY N/A 2/22/2022    Procedure: COLONOSCOPY with random biopsies;  Surgeon: Allegra Capellan MD;  Location: [facility] END[facility];  Service: Gastroenterology;  Laterality: N/A;  normal   • ENDOSCOPY     • ENDOSCOPY N/A 2/22/2022    Procedure: ESOPHAGOGASTRODUODENOSCOPY with biopsies;  Surgeon: Allegra Capellan MD;  Location: [facility] ENDOSCOPY;  Service: Gastroenterology;  Laterality: N/A;  gastritis and HH   • HYSTEROSCOPY     • TONSILLECTOMY     • TUBAL ABDOMINAL LIGATION     • UPPER GASTROINTESTINAL ENDOSCOPY         Social History:    reports that she quit smoking about 3 years ago. Her smoking use included cigarettes and electronic cigarette. She has a 0.13 pack-year smoking history. She has never used smokeless tobacco. She reports that she does not currently use alcohol. She reports that she does not currently use drugs.      Objective     Vitals:        PHYSICAL EXAM:   General- NAD, alert and oriented, appropriate  Psych- Normal mood, good memory  CV- Regular rhythm, no murnurs  Resp- CTA to bases, no wheezes  Abdomen- NABS, soft, non distended, non tender, no masses  Pelvic- Defer to OR           Assessment / Plan     Assessment:  1. Sterilization     Plan:   • Operative laparoscopy with bilateral salpingectomy   • Patient to follow up in office in 2 weeks for post op visit. All questions and concerns have been answered.       Counseling: Risks and benefits and alternatives of surgery were discussed with patient.  Risks are not limited to anesthesia, bleeding, blood transfusion, infection, damage to surrounding organs, wound separation, re-operation, thromboembolic disease, death.  See separate written and signed consent for surgical specific risks and benefits.        Signature:    Electronically signed by:    Precious Mendoza DO  04/14/23  07:46 EDT

## 2023-04-14 ENCOUNTER — ANESTHESIA EVENT (OUTPATIENT)
Dept: PERIOP | Facility: HOSPITAL | Age: 23
End: 2023-04-14
Payer: COMMERCIAL

## 2023-04-14 ENCOUNTER — HOSPITAL ENCOUNTER (OUTPATIENT)
Facility: HOSPITAL | Age: 23
Setting detail: HOSPITAL OUTPATIENT SURGERY
Discharge: HOME | End: 2023-04-14
Attending: OBSTETRICS & GYNECOLOGY | Admitting: OBSTETRICS & GYNECOLOGY
Payer: COMMERCIAL

## 2023-04-14 ENCOUNTER — ANESTHESIA (OUTPATIENT)
Dept: PERIOP | Facility: HOSPITAL | Age: 23
End: 2023-04-14
Payer: COMMERCIAL

## 2023-04-14 VITALS
HEIGHT: 63 IN | WEIGHT: 197.53 LBS | TEMPERATURE: 97 F | RESPIRATION RATE: 15 BRPM | BODY MASS INDEX: 35 KG/M2 | OXYGEN SATURATION: 94 % | HEART RATE: 60 BPM | DIASTOLIC BLOOD PRESSURE: 81 MMHG | SYSTOLIC BLOOD PRESSURE: 129 MMHG

## 2023-04-14 DIAGNOSIS — Z98.890 HISTORY OF FEMALE STERILIZATION: Primary | ICD-10-CM

## 2023-04-14 DIAGNOSIS — Z30.2 ENCOUNTER FOR STERILIZATION: ICD-10-CM

## 2023-04-14 LAB
ABO GROUP BLD: NORMAL
B-HCG UR QL: NEGATIVE
BLD GP AB SCN SERPL QL: NEGATIVE
RH BLD: POSITIVE
T&S EXPIRATION DATE: NORMAL

## 2023-04-14 PROCEDURE — 88302 TISSUE EXAM BY PATHOLOGIST: CPT | Performed by: OBSTETRICS & GYNECOLOGY

## 2023-04-14 PROCEDURE — 86900 BLOOD TYPING SEROLOGIC ABO: CPT | Performed by: OBSTETRICS & GYNECOLOGY

## 2023-04-14 PROCEDURE — 25010000002 PROPOFOL 10 MG/ML EMULSION: Performed by: NURSE ANESTHETIST, CERTIFIED REGISTERED

## 2023-04-14 PROCEDURE — 25010000002 ONDANSETRON PER 1 MG: Performed by: NURSE ANESTHETIST, CERTIFIED REGISTERED

## 2023-04-14 PROCEDURE — 86901 BLOOD TYPING SEROLOGIC RH(D): CPT | Performed by: OBSTETRICS & GYNECOLOGY

## 2023-04-14 PROCEDURE — 25010000002 DEXAMETHASONE PER 1 MG: Performed by: NURSE ANESTHETIST, CERTIFIED REGISTERED

## 2023-04-14 PROCEDURE — 25010000002 KETOROLAC TROMETHAMINE PER 15 MG: Performed by: NURSE ANESTHETIST, CERTIFIED REGISTERED

## 2023-04-14 PROCEDURE — 0 MEPERIDINE PER 100 MG: Performed by: NURSE ANESTHETIST, CERTIFIED REGISTERED

## 2023-04-14 PROCEDURE — 25010000002 FENTANYL CITRATE (PF) 50 MCG/ML SOLUTION: Performed by: NURSE ANESTHETIST, CERTIFIED REGISTERED

## 2023-04-14 PROCEDURE — 25010000002 MIDAZOLAM PER 1MG: Performed by: ANESTHESIOLOGY

## 2023-04-14 PROCEDURE — 81025 URINE PREGNANCY TEST: CPT | Performed by: OBSTETRICS & GYNECOLOGY

## 2023-04-14 PROCEDURE — 86850 RBC ANTIBODY SCREEN: CPT | Performed by: OBSTETRICS & GYNECOLOGY

## 2023-04-14 RX ORDER — LIDOCAINE HYDROCHLORIDE 20 MG/ML
INJECTION, SOLUTION EPIDURAL; INFILTRATION; INTRACAUDAL; PERINEURAL AS NEEDED
Status: DISCONTINUED | OUTPATIENT
Start: 2023-04-14 | End: 2023-04-14 | Stop reason: SURG

## 2023-04-14 RX ORDER — HYDROCODONE BITARTRATE AND ACETAMINOPHEN 5; 325 MG/1; MG/1
1 TABLET ORAL EVERY 6 HOURS PRN
Qty: 5 TABLET | Refills: 0 | Status: SHIPPED | OUTPATIENT
Start: 2023-04-14

## 2023-04-14 RX ORDER — ROCURONIUM BROMIDE 10 MG/ML
INJECTION, SOLUTION INTRAVENOUS AS NEEDED
Status: DISCONTINUED | OUTPATIENT
Start: 2023-04-14 | End: 2023-04-14 | Stop reason: SURG

## 2023-04-14 RX ORDER — SODIUM CHLORIDE 0.9 % (FLUSH) 0.9 %
3 SYRINGE (ML) INJECTION EVERY 12 HOURS SCHEDULED
Status: DISCONTINUED | OUTPATIENT
Start: 2023-04-14 | End: 2023-04-14 | Stop reason: HOSPADM

## 2023-04-14 RX ORDER — MEPERIDINE HYDROCHLORIDE 25 MG/ML
12.5 INJECTION INTRAMUSCULAR; INTRAVENOUS; SUBCUTANEOUS
Status: DISCONTINUED | OUTPATIENT
Start: 2023-04-14 | End: 2023-04-14 | Stop reason: HOSPADM

## 2023-04-14 RX ORDER — ACETAMINOPHEN 500 MG
1000 TABLET ORAL ONCE
Status: COMPLETED | OUTPATIENT
Start: 2023-04-14 | End: 2023-04-14

## 2023-04-14 RX ORDER — AMOXICILLIN 250 MG
1 CAPSULE ORAL DAILY PRN
Qty: 20 TABLET | Refills: 0 | Status: SHIPPED | OUTPATIENT
Start: 2023-04-14

## 2023-04-14 RX ORDER — SCOLOPAMINE TRANSDERMAL SYSTEM 1 MG/1
1 PATCH, EXTENDED RELEASE TRANSDERMAL ONCE
Status: DISCONTINUED | OUTPATIENT
Start: 2023-04-14 | End: 2023-04-14 | Stop reason: HOSPADM

## 2023-04-14 RX ORDER — PROMETHAZINE HYDROCHLORIDE 12.5 MG/1
25 TABLET ORAL ONCE AS NEEDED
Status: DISCONTINUED | OUTPATIENT
Start: 2023-04-14 | End: 2023-04-14 | Stop reason: HOSPADM

## 2023-04-14 RX ORDER — PROPOFOL 10 MG/ML
VIAL (ML) INTRAVENOUS AS NEEDED
Status: DISCONTINUED | OUTPATIENT
Start: 2023-04-14 | End: 2023-04-14 | Stop reason: SURG

## 2023-04-14 RX ORDER — OXYCODONE HYDROCHLORIDE 5 MG/1
5 TABLET ORAL
Status: DISCONTINUED | OUTPATIENT
Start: 2023-04-14 | End: 2023-04-14 | Stop reason: HOSPADM

## 2023-04-14 RX ORDER — BUPIVACAINE HYDROCHLORIDE AND EPINEPHRINE 2.5; 5 MG/ML; UG/ML
INJECTION, SOLUTION EPIDURAL; INFILTRATION; INTRACAUDAL; PERINEURAL AS NEEDED
Status: DISCONTINUED | OUTPATIENT
Start: 2023-04-14 | End: 2023-04-14 | Stop reason: HOSPADM

## 2023-04-14 RX ORDER — KETOROLAC TROMETHAMINE 30 MG/ML
INJECTION, SOLUTION INTRAMUSCULAR; INTRAVENOUS AS NEEDED
Status: DISCONTINUED | OUTPATIENT
Start: 2023-04-14 | End: 2023-04-14 | Stop reason: SURG

## 2023-04-14 RX ORDER — IBUPROFEN 800 MG/1
800 TABLET ORAL EVERY 6 HOURS PRN
Qty: 20 TABLET | Refills: 0 | Status: SHIPPED | OUTPATIENT
Start: 2023-04-14

## 2023-04-14 RX ORDER — MIDAZOLAM HYDROCHLORIDE 2 MG/2ML
2 INJECTION, SOLUTION INTRAMUSCULAR; INTRAVENOUS ONCE
Status: COMPLETED | OUTPATIENT
Start: 2023-04-14 | End: 2023-04-14

## 2023-04-14 RX ORDER — DEXMEDETOMIDINE HYDROCHLORIDE 100 UG/ML
INJECTION, SOLUTION INTRAVENOUS AS NEEDED
Status: DISCONTINUED | OUTPATIENT
Start: 2023-04-14 | End: 2023-04-14 | Stop reason: SURG

## 2023-04-14 RX ORDER — ESCITALOPRAM OXALATE 10 MG/1
10 TABLET ORAL DAILY
Qty: 30 TABLET | Refills: 5 | Status: SHIPPED | OUTPATIENT
Start: 2023-04-14

## 2023-04-14 RX ORDER — GLYCOPYRROLATE 0.2 MG/ML
INJECTION INTRAMUSCULAR; INTRAVENOUS AS NEEDED
Status: DISCONTINUED | OUTPATIENT
Start: 2023-04-14 | End: 2023-04-14 | Stop reason: SURG

## 2023-04-14 RX ORDER — MAGNESIUM HYDROXIDE 1200 MG/15ML
LIQUID ORAL AS NEEDED
Status: DISCONTINUED | OUTPATIENT
Start: 2023-04-14 | End: 2023-04-14 | Stop reason: HOSPADM

## 2023-04-14 RX ORDER — PROMETHAZINE HYDROCHLORIDE 25 MG/1
25 SUPPOSITORY RECTAL ONCE AS NEEDED
Status: DISCONTINUED | OUTPATIENT
Start: 2023-04-14 | End: 2023-04-14 | Stop reason: HOSPADM

## 2023-04-14 RX ORDER — SODIUM CHLORIDE 9 MG/ML
40 INJECTION, SOLUTION INTRAVENOUS AS NEEDED
Status: DISCONTINUED | OUTPATIENT
Start: 2023-04-14 | End: 2023-04-14 | Stop reason: HOSPADM

## 2023-04-14 RX ORDER — ONDANSETRON 2 MG/ML
INJECTION INTRAMUSCULAR; INTRAVENOUS AS NEEDED
Status: DISCONTINUED | OUTPATIENT
Start: 2023-04-14 | End: 2023-04-14 | Stop reason: SURG

## 2023-04-14 RX ORDER — SODIUM CHLORIDE 0.9 % (FLUSH) 0.9 %
1-10 SYRINGE (ML) INJECTION AS NEEDED
Status: DISCONTINUED | OUTPATIENT
Start: 2023-04-14 | End: 2023-04-14 | Stop reason: HOSPADM

## 2023-04-14 RX ORDER — ONDANSETRON 2 MG/ML
4 INJECTION INTRAMUSCULAR; INTRAVENOUS ONCE AS NEEDED
Status: DISCONTINUED | OUTPATIENT
Start: 2023-04-14 | End: 2023-04-14 | Stop reason: HOSPADM

## 2023-04-14 RX ORDER — FENTANYL CITRATE 50 UG/ML
INJECTION, SOLUTION INTRAMUSCULAR; INTRAVENOUS AS NEEDED
Status: DISCONTINUED | OUTPATIENT
Start: 2023-04-14 | End: 2023-04-14 | Stop reason: SURG

## 2023-04-14 RX ORDER — DEXAMETHASONE SODIUM PHOSPHATE 4 MG/ML
INJECTION, SOLUTION INTRA-ARTICULAR; INTRALESIONAL; INTRAMUSCULAR; INTRAVENOUS; SOFT TISSUE AS NEEDED
Status: DISCONTINUED | OUTPATIENT
Start: 2023-04-14 | End: 2023-04-14 | Stop reason: SURG

## 2023-04-14 RX ORDER — SODIUM CHLORIDE, SODIUM LACTATE, POTASSIUM CHLORIDE, CALCIUM CHLORIDE 600; 310; 30; 20 MG/100ML; MG/100ML; MG/100ML; MG/100ML
9 INJECTION, SOLUTION INTRAVENOUS CONTINUOUS PRN
Status: DISCONTINUED | OUTPATIENT
Start: 2023-04-14 | End: 2023-04-14 | Stop reason: HOSPADM

## 2023-04-14 RX ADMIN — DEXMEDETOMIDINE HYDROCHLORIDE 10 MCG: 100 INJECTION, SOLUTION, CONCENTRATE INTRAVENOUS at 10:53

## 2023-04-14 RX ADMIN — DEXMEDETOMIDINE HYDROCHLORIDE 10 MCG: 100 INJECTION, SOLUTION, CONCENTRATE INTRAVENOUS at 10:19

## 2023-04-14 RX ADMIN — DEXMEDETOMIDINE HYDROCHLORIDE 10 MCG: 100 INJECTION, SOLUTION, CONCENTRATE INTRAVENOUS at 10:35

## 2023-04-14 RX ADMIN — SCOPOLAMINE 1 PATCH: 1.5 PATCH, EXTENDED RELEASE TRANSDERMAL at 09:22

## 2023-04-14 RX ADMIN — ROCURONIUM BROMIDE 50 MG: 10 INJECTION, SOLUTION INTRAVENOUS at 10:23

## 2023-04-14 RX ADMIN — DEXMEDETOMIDINE HYDROCHLORIDE 10 MCG: 100 INJECTION, SOLUTION, CONCENTRATE INTRAVENOUS at 10:41

## 2023-04-14 RX ADMIN — SUGAMMADEX 200 MG: 100 INJECTION, SOLUTION INTRAVENOUS at 11:15

## 2023-04-14 RX ADMIN — PROPOFOL 200 MG: 10 INJECTION, EMULSION INTRAVENOUS at 10:23

## 2023-04-14 RX ADMIN — DEXMEDETOMIDINE HYDROCHLORIDE 10 MCG: 100 INJECTION, SOLUTION, CONCENTRATE INTRAVENOUS at 10:26

## 2023-04-14 RX ADMIN — LIDOCAINE HYDROCHLORIDE 100 MG: 20 INJECTION, SOLUTION EPIDURAL; INFILTRATION; INTRACAUDAL; PERINEURAL at 10:23

## 2023-04-14 RX ADMIN — MEPERIDINE HYDROCHLORIDE 12.5 MG: 25 INJECTION INTRAMUSCULAR; INTRAVENOUS; SUBCUTANEOUS at 12:09

## 2023-04-14 RX ADMIN — SODIUM CHLORIDE, POTASSIUM CHLORIDE, SODIUM LACTATE AND CALCIUM CHLORIDE 9 ML/HR: 600; 310; 30; 20 INJECTION, SOLUTION INTRAVENOUS at 09:22

## 2023-04-14 RX ADMIN — ACETAMINOPHEN 1000 MG: 500 TABLET ORAL at 08:33

## 2023-04-14 RX ADMIN — ONDANSETRON 4 MG: 2 INJECTION INTRAMUSCULAR; INTRAVENOUS at 10:41

## 2023-04-14 RX ADMIN — FENTANYL CITRATE 100 MCG: 50 INJECTION, SOLUTION INTRAMUSCULAR; INTRAVENOUS at 10:23

## 2023-04-14 RX ADMIN — GLYCOPYRROLATE 0.2 MG: 0.2 INJECTION INTRAMUSCULAR; INTRAVENOUS at 11:15

## 2023-04-14 RX ADMIN — DEXAMETHASONE SODIUM PHOSPHATE 4 MG: 4 INJECTION, SOLUTION INTRA-ARTICULAR; INTRALESIONAL; INTRAMUSCULAR; INTRAVENOUS; SOFT TISSUE at 10:41

## 2023-04-14 RX ADMIN — KETOROLAC TROMETHAMINE 30 MG: 30 INJECTION, SOLUTION INTRAMUSCULAR; INTRAVENOUS at 10:41

## 2023-04-14 RX ADMIN — MIDAZOLAM HYDROCHLORIDE 2 MG: 1 INJECTION, SOLUTION INTRAMUSCULAR; INTRAVENOUS at 10:12

## 2023-04-14 RX ADMIN — SODIUM CHLORIDE, POTASSIUM CHLORIDE, SODIUM LACTATE AND CALCIUM CHLORIDE: 600; 310; 30; 20 INJECTION, SOLUTION INTRAVENOUS at 10:55

## 2023-04-14 NOTE — ANESTHESIA POSTPROCEDURE EVALUATION
Patient: Carmina Son    Procedure Summary     Date: 04/14/23 Room / Location: [facility] OR  / [facility]    Anesthesia Start: 1016 Anesthesia Stop: 1125    Procedure: SALPINGECTOMY LAPAROSCOPIC BILATERAL (Bilateral: Abdomen) Diagnosis:       Encounter for sterilization      (Encounter for sterilization [Z30.2])    Surgeons: Precious Mendoza DO Provider: Edgardo Merritt MD    Anesthesia Type: general ASA Status: 2          Anesthesia Type: general    Vitals  Vitals Value Taken Time   /88 04/14/23 1222   Temp 35.9 °C (96.7 °F) 04/14/23 1127   Pulse 55 04/14/23 1226   Resp 14 04/14/23 1127   SpO2 95 % 04/14/23 1226   Vitals shown include unvalidated device data.        Post Anesthesia Care and Evaluation    Patient location during evaluation: bedside  Patient participation: complete - patient participated  Level of consciousness: awake  Pain management: adequate    Airway patency: patent  PONV Status: none  Cardiovascular status: acceptable and stable  Respiratory status: acceptable  Hydration status: acceptable    Comments: An Anesthesiologist personally participated in the most demanding procedures (including induction and emergence if applicable) in the anesthesia plan, monitored the course of anesthesia administration at frequent intervals and remained physically present and available for immediate diagnosis and treatment of emergencies.

## 2023-04-14 NOTE — DISCHARGE INSTRUCTIONS
DISCHARGE INSTRUCTIONS  GYNECOLOGICAL  PROCEDURES      For your surgery you had:  General anesthesia (you may have a sore throat for the first 24 hours)  IV sedation  Local anesthesia  Monitored anesthesia care  You received a medicated patch for nausea prevention today (behind your ear). It is recommended that you remove it 24-48 hours post-operatively. It must be removed within 72 hours.  You may experience dizziness, drowsiness, or lightheadedness for several hours following surgery.  Do not stay alone today or tonight.  Limit your activity for 24 hours.  Resume your diet slowly.  Follow any special dietary instructions you may have been given by your doctor.  You should not drive or operate machinery, drink alcohol, or sign legally binding documents for 24 hours or while you are taking pain medication.  Last dose of pain medication was given at:  .  NOTIFY YOUR DOCTOR IF YOU EXPERIENCE ANY OF THE FOLLOWING:  Temperature greater than 101 degrees Fahrenheit  Shaking Chills  Redness or excessive drainage from incision  Nausea, vomiting and/or pain that is not controlled by prescribed medications  Increase in bleeding or bleeding that is excessive  Unable to urinate in 6 hours after surgery  If unable to reach your doctor, please go to the closest Emergency Room [x] Nothing in the vagina for 2 weeks to include intercourse, douches, or tampons.  [x] Vaginal bleeding may be expected for several days with flow decreasing with time and never any heavier than a normal   period.  If you have an ablation, vaginal discharge is expected after bleeding stops.   If you have foul smelling discharge, notify your physician.  Medications per physician instructions as indicated on Discharge Medication Information Sheet.  You should see Dr. Mendoza for follow-up care   on  April 25th @ 12:15pm .    SPECIAL INSTRUCTIONS:

## 2023-04-14 NOTE — ANESTHESIA PREPROCEDURE EVALUATION
Anesthesia Evaluation     Patient summary reviewed and Nursing notes reviewed   no history of anesthetic complications:  NPO Solid Status: > 8 hours  NPO Liquid Status: > 2 hours           Airway   Mallampati: II  TM distance: >3 FB  Neck ROM: full  No difficulty expected  Dental      Pulmonary - negative pulmonary ROS and normal exam    breath sounds clear to auscultation  Cardiovascular - negative cardio ROS and normal exam  Exercise tolerance: good (4-7 METS)    Rhythm: regular  Rate: normal        Neuro/Psych- negative ROS  GI/Hepatic/Renal/Endo    (+)   thyroid problem     Musculoskeletal (-) negative ROS    Abdominal    Substance History - negative use     OB/GYN negative ob/gyn ROS         Other - negative ROS       ROS/Med Hx Other: PAT Nursing Notes unavailable.                   Anesthesia Plan    ASA 2     general       Anesthetic plan, risks, benefits, and alternatives have been provided, discussed and informed consent has been obtained with: patient and spouse/significant other.        CODE STATUS:    Level Of Support Discussed With: Patient  Code Status (Patient has no pulse and is not breathing): CPR (Attempt to Resuscitate)  Medical Interventions (Patient has pulse or is breathing): Full Support  Release to patient: Routine Release

## 2023-04-14 NOTE — OP NOTE
PRE OP DIAGNOSIS:  Sterilization     POST OP DIAGNOSIS:  Same     PROCEDURE: Operative Laparoscopy with Bilateral Salpingectomy     SURGEON:  Precious Mendoza DO    ASSISTANT:Laurie Davis RN CSA was responsible for performing the following activities: Retraction, Suction, Irrigation and Suturing and their skilled assistance was necessary for the success of this case.      ANESTHESIA PROVIDER:  Edgardo Merritt MD    ANESTHESIA TYPE:  General    ESTIMATED BLOOD LOSS:  10ml     COMPLICATIONS:  None    DISPOSITION:  To recovery room in stable condition    FINDINGS: Left Filshie clip on left fallopian tube,  Right Filshie clip clamped on side wall, Normal uterus, normal bilateral ovaries     SPECIMEN: Bilateral fallopian tubes    PROCEDURE:    The patient was taken to the operating room where general anesthesia was placed without difficulty.  She was prepped and draped in a normal sterile fashion and placed in low lithotomy position. A straight catheter was used to drain the bladder.  An exam under anesthesia was performed.  A speculum was placed in the vagina and a single-tooth tenaculum was used to grasp the anterior lip of the cervix. Sponge stick was placed in the vagina for uterine manipulation.  Attention was then turned to the abdomen.    A 5 mm incision was made in the umbilicus. The anterior abdominal wall was tented up. Opti-Tala non-bladed trocar was placed intra-abdominally visualizing all tissue planes through to peritoneum. Entry into the intra-abdominal cavity was confirmed. CO2 pneumoperitoneum was inflated.    A full evaluation of the abdomen and pelvis was performed with findings as above.  There was no noted damage to underlying bowel, bladder, blood vessels, or organs. The patient was placed in steep trendelenburg. A 5 mm incision was made on the patient's left side 2 cm superior to the ASIS and 8cm from the umbilicus. A 5 mm trocar was placed under direct visualization.  A 10 mm incision was  made on the patient's right side 2 cm superior to the ASIS and 8cm from the umbilicus, an 11 mm trocar was placed under direct visualization.      The fallopian tubes were identified, followed to the fimbriated ends and Ligasure instrument was used to cauterize and cut the fallopian tubes 2cm from the cornu of the uterus bilateral. Left filshie clip removed with left fallopian tube. Right filshie clip was attached to tissue on right side wall, attempted removal but adhered to tissue, decision made to keep filshie clip in place due to risk of bleeding and damage to surrounding structures.   Hemostasis was noted and pictures of each side were taken. CO2 pneumoperitoneum was deflated to a pressure of 6 and no bleeding noted. CO2 pneumoperitoneum was deflated.  All trocars were removed.     The  skin was closed with 4-0 Vicryl in a subcuticular fashion.    All instruments were removed from the patient's vagina.  Sponge, lap, and instrument counts are correct times 2.  The patient was taken to the recovery room awake and in stable condition.    Pt was discharged in stable condition from PACU with script for pain medication and instructions to follow up in the office in 2-3 weeks.    Electronically signed by:    Precious Mendoza DO  04/14/23  11:19 EDT

## 2023-04-28 ENCOUNTER — OFFICE VISIT (OUTPATIENT)
Dept: OBSTETRICS AND GYNECOLOGY | Facility: CLINIC | Age: 23
End: 2023-04-28
Payer: COMMERCIAL

## 2023-04-28 VITALS
BODY MASS INDEX: 35.3 KG/M2 | HEART RATE: 83 BPM | HEIGHT: 63 IN | DIASTOLIC BLOOD PRESSURE: 76 MMHG | SYSTOLIC BLOOD PRESSURE: 124 MMHG | WEIGHT: 199.2 LBS

## 2023-04-28 DIAGNOSIS — Z90.79 STATUS POST BILATERAL SALPINGECTOMY: Primary | ICD-10-CM

## 2023-04-28 PROCEDURE — 1160F RVW MEDS BY RX/DR IN RCRD: CPT | Performed by: OBSTETRICS & GYNECOLOGY

## 2023-04-28 PROCEDURE — 99024 POSTOP FOLLOW-UP VISIT: CPT | Performed by: OBSTETRICS & GYNECOLOGY

## 2023-04-28 PROCEDURE — 1159F MED LIST DOCD IN RCRD: CPT | Performed by: OBSTETRICS & GYNECOLOGY

## 2023-04-28 NOTE — PROGRESS NOTES
"Post Operative Visit        Chief Complaint   Patient presents with   • Post-op     Tubal        HPI:   Carmina Son is here for a post op visit.  She had a bilateral salpingectomy and has no complaints.  We have gone over her pathology and any available pictures and all questions have been answered.    Pain:  No  Vaginal bleeding:  No  Vaginal discharge:  No  Fever/chills:  No  Good appetite:  Yes  Normal bladder function:  Yes  Normal bowel function:  Yes  Hot flashes: No    PHYSICAL EXAM:  /76   Pulse 83   Ht 160 cm (63\")   Wt 90.4 kg (199 lb 3.2 oz)   LMP 04/24/2023 (Exact Date)   Breastfeeding No   BMI 35.29 kg/m²   General- NAD, alert and oriented, appropriate  Psych- Normal mood, good memory  Abdomen- Soft, non distended, non tender, no masses  Incisions-  Clean, dry, intact, healing well   Lymphatic- No palpable groin nodes  Ext- No edema, no cyanosis   Skin- No lesions, no rashes        ASSESSMENT and PLAN:  Post-operative exam    Diagnoses and all orders for this visit:    1. Status post bilateral salpingectomy (Primary)        Operative report, surgical findings and any pathology/photos reviewed.  All questions answered.     Counseling:   • Ok to resume normal activities  • Ok to resume intercourse  • Return to school/work without limitations      Follow Up:  Return in about 1 year (around 4/28/2024) for Annual exam.    I spent 20 minutes on the separately reported service of poat op. This time is not included in the time used to support the E/M service also reported today.      Precious Mendoza DO  04/28/2023    [facility]  [address] [place] [address]  Dept: [phone]  Dept Fax: [phone]  Loc: [phone]  Loc Fax: [phone]     "

## 2023-06-29 PROBLEM — R10.30 LOWER ABDOMINAL PAIN: Status: ACTIVE | Noted: 2023-06-29

## 2023-06-29 PROBLEM — F41.9 ANXIETY AND DEPRESSION: Status: ACTIVE | Noted: 2023-06-29

## 2023-06-29 PROBLEM — F32.A ANXIETY AND DEPRESSION: Status: ACTIVE | Noted: 2023-06-29

## 2023-10-19 ENCOUNTER — OFFICE VISIT (OUTPATIENT)
Dept: OBSTETRICS AND GYNECOLOGY | Facility: CLINIC | Age: 23
End: 2023-10-19
Payer: COMMERCIAL

## 2023-10-19 VITALS
WEIGHT: 202.8 LBS | DIASTOLIC BLOOD PRESSURE: 88 MMHG | HEART RATE: 78 BPM | SYSTOLIC BLOOD PRESSURE: 120 MMHG | BODY MASS INDEX: 35.93 KG/M2 | HEIGHT: 63 IN

## 2023-10-19 DIAGNOSIS — Z01.419 WELL WOMAN EXAM WITH ROUTINE GYNECOLOGICAL EXAM: Primary | ICD-10-CM

## 2023-10-19 DIAGNOSIS — R30.0 DYSURIA: ICD-10-CM

## 2023-10-19 LAB
BILIRUB BLD-MCNC: NEGATIVE MG/DL
CLARITY, POC: CLEAR
COLOR UR: YELLOW
GLUCOSE UR STRIP-MCNC: NEGATIVE MG/DL
KETONES UR QL: NEGATIVE
LEUKOCYTE EST, POC: ABNORMAL
NITRITE UR-MCNC: NEGATIVE MG/ML
PH UR: 6 [PH] (ref 5–8)
PROT UR STRIP-MCNC: ABNORMAL MG/DL
RBC # UR STRIP: ABNORMAL /UL
SP GR UR: 1.02 (ref 1–1.03)
UROBILINOGEN UR QL: NORMAL

## 2023-10-19 PROCEDURE — 87624 HPV HI-RISK TYP POOLED RSLT: CPT

## 2023-10-19 PROCEDURE — 87086 URINE CULTURE/COLONY COUNT: CPT | Performed by: OBSTETRICS & GYNECOLOGY

## 2023-10-19 PROCEDURE — G0123 SCREEN CERV/VAG THIN LAYER: HCPCS

## 2023-10-19 RX ORDER — CARIPRAZINE 1.5 MG/1
CAPSULE, GELATIN COATED ORAL
COMMUNITY
Start: 2023-09-19

## 2023-10-19 RX ORDER — FLUOXETINE HYDROCHLORIDE 40 MG/1
CAPSULE ORAL
COMMUNITY
Start: 2023-09-05

## 2023-10-19 RX ORDER — HYDROXYZINE HYDROCHLORIDE 25 MG/1
TABLET, FILM COATED ORAL
COMMUNITY
Start: 2023-09-05

## 2023-10-19 NOTE — PROGRESS NOTES
"Well Woman Visit    CC: Scheduled annual well gyn visit  Chief Complaint   Patient presents with    Gynecologic Exam         HPI:   23 y.o. who presents today for annual exam. Patient states periods are monthly, less than 7 days and not heavy in natures.  She is sexually active with one male partner. She denies any H/O STDS.  She denies any pain with intercourse. She denies any family H/O breast, uterine or ovarian cancer. She denies any vaginal odor, vaginal discharge, F/C, D/C, N/V, CP or SOB. She does complain of some dysuria but denies any hematuria.     Patient reports that she is not currently experiencing any symptoms of urinary incontinence.      History: PMHx, Meds, Allergies, PSHx, Social Hx, and POBHx all reviewed and updated.    ROS:  General ROS: negative for chills or fatigue  Ophthalmic ROS: negative for blurry vision or decreased vision  ENT ROS: negative for epistaxis, headaches or hearing change  Hematological and Lymphatic ROS: negative for bleeding problems or blood clots  Endocrine ROS: negative for breast changes or galactorrhea  Breast ROS: negative for galactorrhea or new or changing breast lumps  Respiratory ROS: negative for cough or hemoptysis  Cardiovascular ROS: negative for chest pain or dyspnea on exertion  Gastrointestinal ROS: negative for abdominal pain or appetite loss  Genito-Urinary ROS: negative for difficulty in urination or vaginal irritation   Musculoskeletal ROS: negative for gait disturbance or joint pain  Neurological ROS: negative for behavioral changes or bowel and bladder control changes  Dermatological ROS: negative for rash  Psych ROS: negative for depression      PHYSICAL EXAM:      /88   Pulse 78   Ht 160 cm (63\")   Wt 92 kg (202 lb 12.8 oz)   LMP 10/18/2023 (Exact Date)   BMI 35.92 kg/m²  Not found.    General- NAD, alert and oriented, appropriate  Psych- Normal mood, good memory  Neck- No masses, no thyroid enlargement  CV- Regular rhythm, no " murnurs  Resp- CTA to bases, no wheezes  Abdomen- Soft, non distended, non tender, no masses    Breast Exam: Breast self awareness counseled  Breast left-  Bilaterally symmetrical, no masses, non tender, no nipple discharge  Breast right- Bilaterally symmetrical, no masses, non tender, no nipple discharge    Pelvic Exam:   External genitalia- Normal female, no lesions  Urethra/meatus- Normal, no masses, non tender  Bladder- Normal, no masses, non tender  Vagina- Normal, no atrophy, no lesions, no discharge.  Prolapse : none noted, not examined with split speculum to delineate  Cvx- Normal, no lesions, no discharge, No cervical motion tenderness  Uterus- Normal size, shape & consistency.  Non tender, mobile, & no prolapse  Adnexa- No mass, non tender  Anus/Rectum/Perineum- Declined    Lymphatic- No palpable neck, axillary, or groin nodes  Ext- No edema, no cyanosis    Skin- No lesions, no rashes, no acanthosis nigricans      ASSESSMENT and PLAN:    Diagnoses and all orders for this visit:    1. Well woman exam with routine gynecological exam (Primary)  -     IGP,rfx Aptima HPV All Pth    2. Dysuria  -     Urine Culture - Urine, Urine, Clean Catch  -     POC Urinalysis Dipstick        Preventative:  1. Annuals every year; Cytology collections per prevailing guidelines.   2. Mammograms begin every year at 41 yo if no abnormalities are found and no family history.  3. Bone density studies begin at 64 yo. If no fracture history or osteoporosis family history.  4. Colonoscopy begins at 44 yo. Repeat every ten years if negative and no family history.  5. Calcium of 9091-4764 mg/day in split dosing  6. Vitamin D 400-800 IU/day  7. All other preventative health recommendations will be managed by the patients Primary care physician.    She understands the importance of having any ordered tests to be performed in a timely fashion.  The risks of not performing them include, but are not limited to, advanced cancer stages, bone  loss from osteoporosis and/or subsequent increase in morbidity and/or mortality.  She is encouraged to review her results online and/or contact or office if she has questions.     Follow Up:  Return in about 1 year (around 10/19/2024) for Annual exam.    I spent 20 minutes on the separately reported service of Annual. This time is not included in the time used to support the E/M service also reported today.        Precious Mendoza, DO  10/19/2023    [facility]  [address] [place] [address]  Dept: [phone]  Dept Fax: [phone]  Loc: [phone]  Loc Fax: [phone]

## 2023-10-20 LAB — BACTERIA SPEC AEROBE CULT: NO GROWTH

## 2023-10-26 LAB
CONV .: ABNORMAL
CYTOLOGIST CVX/VAG CYTO: ABNORMAL
CYTOLOGY CVX/VAG DOC CYTO: ABNORMAL
CYTOLOGY CVX/VAG DOC THIN PREP: ABNORMAL
DX ICD CODE: ABNORMAL
DX ICD CODE: ABNORMAL
HIV 1 & 2 AB SER-IMP: ABNORMAL
HPV I/H RISK 4 DNA CVX QL PROBE+SIG AMP: POSITIVE
OTHER STN SPEC: ABNORMAL
PATHOLOGIST CVX/VAG CYTO: ABNORMAL
RECOM F/U CVX/VAG CYTO: ABNORMAL
STAT OF ADQ CVX/VAG CYTO-IMP: ABNORMAL

## 2023-11-07 RX ORDER — LEVOTHYROXINE SODIUM 50 UG/1
50 TABLET ORAL DAILY
Qty: 90 TABLET | Refills: 0 | Status: SHIPPED | OUTPATIENT
Start: 2023-11-07

## 2023-11-13 ENCOUNTER — PROCEDURE VISIT (OUTPATIENT)
Dept: OBSTETRICS AND GYNECOLOGY | Facility: CLINIC | Age: 23
End: 2023-11-13
Payer: COMMERCIAL

## 2023-11-13 VITALS
BODY MASS INDEX: 35.79 KG/M2 | WEIGHT: 202 LBS | SYSTOLIC BLOOD PRESSURE: 126 MMHG | HEART RATE: 84 BPM | HEIGHT: 63 IN | DIASTOLIC BLOOD PRESSURE: 82 MMHG

## 2023-11-13 DIAGNOSIS — R87.612 LGSIL ON PAP SMEAR OF CERVIX: Primary | ICD-10-CM

## 2023-11-13 PROCEDURE — 88305 TISSUE EXAM BY PATHOLOGIST: CPT | Performed by: OBSTETRICS & GYNECOLOGY

## 2023-11-13 NOTE — PROGRESS NOTES
"Colposcopy    CC:  Pt presents for colposcopy  Chief Complaint   Patient presents with    Colposcopy       Pregnant: No  Birth control: Sterilization/tubal  Tobacco/Nicotine use:  No  Pap result: LGSIL, HPV HIGH RISK POSITIVE- non specified type  Consent signed: Yes    Procedure reviewed in detail.  She understands the potential risks include, but are not limited to, pain, bleeding, and infection.  Her questions have been answered.    Subjective/HPI:Patient is a 23 y.o. female with abnormal pap smear results       Objective:  /82   Pulse 84   Ht 160 cm (63\")   Wt 91.6 kg (202 lb)   LMP 11/09/2023 (Exact Date) Comment: salpingectomy  BMI 35.78 kg/m²   External genitalia- Without lesion   Perineum- Without lesion  Vagina- Without lesion Vaginal Colposcopy : Vinegar solution and lugols used to stain vagina, speculum rotated to allow visualization of entire vagina  Cvx- Inadequate  Patient tolerated the procedure well.    Biopsy: Acetowhite changes noted at 12 o'clock position     ECC:  yes    T Zone seen:  no    Findings:  no mosaicism, no punctation, no abnormal vasculature, and visible lesion(s) at 12 o'clock    Physical Exam  Genitourinary:                   Assessment and Plan:  Diagnoses and all orders for this visit:    1. 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  HPV incidence, transmission, course, remission, progression, types, cervical cancer, genital warts, monitoring, and importance of compliance were reviewed.  Importance of maintaining a healthy lifestyle, HPV vaccine was discussed and recommended.  Written information was made available    She understands the need for continued follow up until she is cleared to return to routine screening pap smears.  She understands the importance of follow up and consequences with lack of follow up (i.e. potential for cervical cancer).  Follow up usually ranges " every 6months - 12months.      PRECAUTIONS - It is common to have bright red spotting and dark discharge after today.  If she has had cervical biopsies, she is to avoid intercourse or tampons as directed for 7 days.  She can use OTC pain relievers prn.  She needs to return to office or ER (if after hours/weekends) if she has pelvic pain, bleeding > 1 pad/2 hours, discharge that has a bad vaginal odor or vaginal itching, fever > 101.5, or any other concerns.      I spent 20 minutes on the separately reported service of Colposcopy. This time is not included in the time used to support the E/M service also reported today.    Follow Up:  Return in about 1 year (around 11/13/2024) for Annual exam.      Precious Mendoza DO  11/13/2023    [facility]  [address] [place] [address]  Dept: [phone]  Dept Fax: [phone]  Loc: [phone]  Loc Fax: [phone]

## 2023-11-20 ENCOUNTER — TELEPHONE (OUTPATIENT)
Dept: OBSTETRICS AND GYNECOLOGY | Facility: CLINIC | Age: 23
End: 2023-11-20
Payer: COMMERCIAL

## 2023-11-20 NOTE — TELEPHONE ENCOUNTER
----- Message from Precious Mendoza DO sent at 11/15/2023 11:00 AM EST -----  Biopsy taken with colposcopy normal and benign. Repeat pap smear in 1 year.     Electronically signed by:    Precious Mendoza DO  11/15/23  11:00 EST

## 2023-11-27 ENCOUNTER — TELEPHONE (OUTPATIENT)
Dept: OBSTETRICS AND GYNECOLOGY | Facility: CLINIC | Age: 23
End: 2023-11-27
Payer: COMMERCIAL

## 2024-01-25 RX ORDER — LEVOTHYROXINE SODIUM 50 UG/1
50 TABLET ORAL DAILY
Qty: 30 TABLET | Refills: 0 | Status: SHIPPED | OUTPATIENT
Start: 2024-01-25

## 2024-01-25 NOTE — TELEPHONE ENCOUNTER
PATTIF 11/07/2023  LOV 11/09/2022  F/U none on file,called patient to inform her that she needs an appointment since she has not been here since 2022

## 2024-03-04 ENCOUNTER — TELEPHONE (OUTPATIENT)
Dept: OBSTETRICS AND GYNECOLOGY | Facility: CLINIC | Age: 24
End: 2024-03-04
Payer: COMMERCIAL

## 2024-03-04 RX ORDER — LEVOTHYROXINE SODIUM 50 UG/1
50 TABLET ORAL DAILY
Qty: 30 TABLET | Refills: 0 | Status: SHIPPED | OUTPATIENT
Start: 2024-03-04

## 2024-03-04 NOTE — TELEPHONE ENCOUNTER
Provider: DO GOODMAN     Caller: ROLA GAONA    Phone Number: [phone]    Reason for Call: PATIENT IS CALLING TO LET THE PROVIDER KNOW THAT SHE HAS CURRENTLY BEEN ON HER PERIOD FOR 3 WEEKS NOW//ITS JUST AS HEAVY NOW AS IT WAS WHEN SHE FIRST STARTED//SHE SAID SHE'S GOING THROUGH A PAD IN LIKE 2 HOURS//NOT SURE WHAT SHE NEEDS TO DO//PLEASE FOLLOW UP

## 2024-03-11 RX ORDER — CARIPRAZINE 3 MG/1
CAPSULE, GELATIN COATED ORAL
COMMUNITY
Start: 2024-02-11

## 2024-03-19 ENCOUNTER — OFFICE VISIT (OUTPATIENT)
Dept: OBSTETRICS AND GYNECOLOGY | Facility: CLINIC | Age: 24
End: 2024-03-19
Payer: COMMERCIAL

## 2024-03-19 VITALS
HEART RATE: 74 BPM | BODY MASS INDEX: 38.09 KG/M2 | SYSTOLIC BLOOD PRESSURE: 123 MMHG | DIASTOLIC BLOOD PRESSURE: 80 MMHG | WEIGHT: 215 LBS

## 2024-03-19 DIAGNOSIS — N93.9 ABNORMAL UTERINE BLEEDING (AUB): Primary | ICD-10-CM

## 2024-03-19 LAB
DEPRECATED RDW RBC AUTO: 39.7 FL (ref 37–54)
ERYTHROCYTE [DISTWIDTH] IN BLOOD BY AUTOMATED COUNT: 12 % (ref 12.3–15.4)
HCT VFR BLD AUTO: 33.9 % (ref 34–46.6)
HGB BLD-MCNC: 11.7 G/DL (ref 12–15.9)
MCH RBC QN AUTO: 31.5 PG (ref 26.6–33)
MCHC RBC AUTO-ENTMCNC: 34.5 G/DL (ref 31.5–35.7)
MCV RBC AUTO: 91.4 FL (ref 79–97)
PLATELET # BLD AUTO: 228 10*3/MM3 (ref 140–450)
PMV BLD AUTO: 9.9 FL (ref 6–12)
PROLACTIN SERPL-MCNC: 37.9 NG/ML (ref 4.79–23.3)
RBC # BLD AUTO: 3.71 10*6/MM3 (ref 3.77–5.28)
T4 FREE SERPL-MCNC: 0.97 NG/DL (ref 0.93–1.7)
TSH SERPL DL<=0.05 MIU/L-ACNC: 3.2 UIU/ML (ref 0.27–4.2)
WBC NRBC COR # BLD AUTO: 10.19 10*3/MM3 (ref 3.4–10.8)

## 2024-03-19 PROCEDURE — 84439 ASSAY OF FREE THYROXINE: CPT | Performed by: OBSTETRICS & GYNECOLOGY

## 2024-03-19 PROCEDURE — 85027 COMPLETE CBC AUTOMATED: CPT | Performed by: OBSTETRICS & GYNECOLOGY

## 2024-03-19 PROCEDURE — 84146 ASSAY OF PROLACTIN: CPT | Performed by: OBSTETRICS & GYNECOLOGY

## 2024-03-19 PROCEDURE — 84443 ASSAY THYROID STIM HORMONE: CPT | Performed by: OBSTETRICS & GYNECOLOGY

## 2024-03-19 NOTE — PATIENT INSTRUCTIONS
Venipuncture Blood Specimen Collection  Venipuncture performed in left arm by Jennifer Edmond with good hemostasis. Patient tolerated the procedure well without complications.   03/19/24   Jennifer Edmond

## 2024-03-19 NOTE — PROGRESS NOTES
GYN Visit    Chief Complaint   Patient presents with    Menstrual Problem       HPI:   23 y.o. LMP: Patient's last menstrual period was 2024. Here today to discuss AUB. She states for the past 3-4 months she has had heavier periods that have lasted longer than 1 week. She states last month she had one that lasted almost 4 weeks. She also has not been able to lose weight since her last pregnancy. We discussed lifestyle modifications with exercise and healthy eating habits.  We also discussed possible birth control for regulation but she does not wish to try that for now. We talked about doing an US to check for any anatomical reasons for irregular bleeding and to obtains TSH, prolactin and CBC. She understands and agrees. She denies any other complaints today.       History: PMHx, Meds, Allergies, PSHx, Social Hx, and POBHx all reviewed and updated.    PHYSICAL EXAM:  /80   Pulse 74   Wt 97.5 kg (215 lb)   LMP 2024 Comment: Menses ended 3/7, heavy, changing paxi pad every 2 hours on heaviest days  Breastfeeding No   BMI 38.09 kg/m²   General- NAD, alert and oriented, appropriate  Psych- Normal mood, good memory  Neck- No masses, no thyroid enlargement  Extremities- No edema, no cyanosis    Skin- No lesions, no rashes    ASSESSMENT AND PLAN:  Diagnoses and all orders for this visit:    1. Abnormal uterine bleeding (AUB) (Primary)  -     US Pelvis Transvaginal Non OB; Future  -     TSH; Future  -     T4, free; Future  -     Prolactin; Future  -     CBC (No Diff); Future  -     TSH  -     T4, free  -     Prolactin  -     CBC (No Diff)      TRACK MENSES, RTO if <q21 days (frequent) or >q3mo (infrequent IF not on hormonal BC), >7d long, heavy, or painful.        Follow Up:  Return in about 4 weeks (around 2024) for follow up results and US.    I spent 20 minutes caring for [place] on this date of service. This time includes time spent by me in the following activities:preparing for the  visit, reviewing tests, obtaining and/or reviewing a separately obtained history, counseling and educating the patient/family/caregiver, ordering medications, tests, or procedures, and documenting information in the medical record    Precious Mendoza DO  03/19/2024    [facility]  [facility] [place] [address]  Dept: [phone]  Dept Fax: [phone]  Loc: [phone]  Loc Fax: [phone]

## 2024-03-25 ENCOUNTER — OFFICE VISIT (OUTPATIENT)
Dept: FAMILY MEDICINE CLINIC | Facility: CLINIC | Age: 24
End: 2024-03-25
Payer: COMMERCIAL

## 2024-03-25 VITALS
DIASTOLIC BLOOD PRESSURE: 52 MMHG | OXYGEN SATURATION: 98 % | WEIGHT: 219 LBS | SYSTOLIC BLOOD PRESSURE: 116 MMHG | HEART RATE: 84 BPM | HEIGHT: 63 IN | BODY MASS INDEX: 38.8 KG/M2

## 2024-03-25 DIAGNOSIS — E66.9 CLASS 2 OBESITY WITH BODY MASS INDEX (BMI) OF 38.0 TO 38.9 IN ADULT, UNSPECIFIED OBESITY TYPE, UNSPECIFIED WHETHER SERIOUS COMORBIDITY PRESENT: ICD-10-CM

## 2024-03-25 DIAGNOSIS — D64.9 ANEMIA, UNSPECIFIED TYPE: ICD-10-CM

## 2024-03-25 DIAGNOSIS — E03.9 HYPOTHYROIDISM, UNSPECIFIED TYPE: ICD-10-CM

## 2024-03-25 DIAGNOSIS — R53.83 FATIGUE, UNSPECIFIED TYPE: ICD-10-CM

## 2024-03-25 DIAGNOSIS — Z00.00 ANNUAL PHYSICAL EXAM: Primary | ICD-10-CM

## 2024-03-25 RX ORDER — LEVOTHYROXINE SODIUM 50 UG/1
50 TABLET ORAL DAILY
Qty: 90 TABLET | Refills: 1 | Status: SHIPPED | OUTPATIENT
Start: 2024-03-25

## 2024-03-25 NOTE — PROGRESS NOTES
Chief Complaint  Annual exam, hypothyroidism, fatigue    SUBJECTIVE  Carmina Son presents to [facility] to follow up on Hypothyroidism, and annual exam.  Patient states she has been very fatigued recently.    She has seen OB recently due to having had her period for a month solid, had some labs checked.  He is also scheduled for pelvic ultrasound    Pt sees Magaly Moser in [place] who manages her Prozac and Vraylar.     History of Present Illness  Past Medical History:   Diagnosis Date    Abnormal Pap smear of cervix     Anemia     Anxiety     Depression     Encounter for sterilization     Hypothyroidism     Migraine     Trauma       Family History   Problem Relation Age of Onset    Diabetes Father     Drug abuse Father     Hypertension Father     Mental illness Father     Stroke Father     Stroke Mother     Diabetes Mother     Hypertension Mother     Irritable bowel syndrome Mother     Depression Mother     Drug abuse Mother     Miscarriages / Stillbirths Mother     Thyroid disease Mother     Breast cancer Mother         Pt unsure of age of onset    Asthma Sister     Asthma Sister     Hearing loss Paternal Grandfather     Arthritis Paternal Grandmother     Crohn's disease Maternal Grandmother     Uterine cancer Neg Hx     Ovarian cancer Neg Hx     Colon cancer Neg Hx     Deep vein thrombosis Neg Hx     Pulmonary embolism Neg Hx       Past Surgical History:   Procedure Laterality Date    CHOLECYSTECTOMY      COLONOSCOPY      COLONOSCOPY N/A 02/22/2022    Procedure: COLONOSCOPY with random biopsies;  Surgeon: Allegra Capellan MD;  Location: [facility] ENDOSCOPY;  Service: Gastroenterology;  Laterality: N/A;  normal    ENDOSCOPY      ENDOSCOPY N/A 02/22/2022    Procedure: ESOPHAGOGASTRODUODENOSCOPY with biopsies;  Surgeon: Allegra Capellan MD;  Location: [facility] ENDOSCOPY;  Service: Gastroenterology;  Laterality: N/A;  gastritis and HH    HYSTEROSCOPY       "SALPINGECTOMY Bilateral 04/14/2023    Procedure: SALPINGECTOMY LAPAROSCOPIC BILATERAL;  Surgeon: Precious Mendoza DO;  Location: [facility];  Service: Gynecology;  Laterality: Bilateral;    TONSILLECTOMY      UPPER GASTROINTESTINAL ENDOSCOPY      WISDOM TOOTH EXTRACTION          Current Outpatient Medications:     Euthyrox 50 MCG tablet, Take 1 tablet by mouth Daily., Disp: 90 tablet, Rfl: 1    FLUoxetine (PROzac) 40 MG capsule, , Disp: , Rfl:     Vraylar 3 MG capsule capsule, , Disp: , Rfl:     hydrOXYzine (ATARAX) 25 MG tablet, , Disp: , Rfl:     Vraylar 1.5 MG capsule capsule, , Disp: , Rfl:     OBJECTIVE  Vital Signs:   /52   Pulse 84   Ht 160 cm (63\")   Wt 99.3 kg (219 lb)   LMP 02/08/2024 Comment: Menses ended 3/7, heavy, changing paxi pad every 2 hours on heaviest days  SpO2 98%   BMI 38.79 kg/m²    Estimated body mass index is 38.79 kg/m² as calculated from the following:    Height as of this encounter: 160 cm (63\").    Weight as of this encounter: 99.3 kg (219 lb).     Wt Readings from Last 3 Encounters:   03/25/24 99.3 kg (219 lb)   03/19/24 97.5 kg (215 lb)   11/13/23 91.6 kg (202 lb)     BP Readings from Last 3 Encounters:   03/25/24 116/52   03/19/24 123/80   11/13/23 126/82       Physical Exam  Vitals reviewed.   Constitutional:       General: She is not in acute distress.     Appearance: Normal appearance. She is well-developed. She is not diaphoretic.   HENT:      Head: Normocephalic and atraumatic. Hair is normal.      Right Ear: Hearing, tympanic membrane, ear canal and external ear normal. No decreased hearing noted. No drainage.      Left Ear: Hearing, tympanic membrane, ear canal and external ear normal. No decreased hearing noted.      Nose: Nose normal. No nasal deformity.      Mouth/Throat:      Mouth: Mucous membranes are moist.   Eyes:      General: Lids are normal.         Right eye: No discharge.         Left eye: No discharge.      Extraocular Movements: Extraocular " movements intact.      Conjunctiva/sclera: Conjunctivae normal.      Pupils: Pupils are equal, round, and reactive to light.   Neck:      Thyroid: No thyromegaly.      Vascular: No JVD.   Cardiovascular:      Rate and Rhythm: Normal rate and regular rhythm.      Pulses: Normal pulses.      Heart sounds: Normal heart sounds. No murmur heard.     No friction rub. No gallop.   Pulmonary:      Effort: Pulmonary effort is normal. No respiratory distress.      Breath sounds: Normal breath sounds. No wheezing or rales.   Chest:      Chest wall: No tenderness.   Abdominal:      General: Bowel sounds are normal. There is no distension.      Palpations: Abdomen is soft. There is no mass.      Tenderness: There is no abdominal tenderness. There is no guarding or rebound.      Hernia: No hernia is present.   Musculoskeletal:         General: No tenderness or deformity. Normal range of motion.      Cervical back: Normal range of motion and neck supple.   Lymphadenopathy:      Cervical: No cervical adenopathy.   Skin:     General: Skin is warm and dry.      Findings: No erythema or rash.   Neurological:      Mental Status: She is alert and oriented to person, place, and time.      Cranial Nerves: No cranial nerve deficit.      Motor: No abnormal muscle tone.      Coordination: Coordination normal.      Deep Tendon Reflexes: Reflexes are normal and symmetric. Reflexes normal.   Psychiatric:         Mood and Affect: Mood normal.         Behavior: Behavior normal.         Thought Content: Thought content normal.         Judgment: Judgment normal.          Result Review    [facility]          7/3/2023    12:38   CMP   Glucose 78    BUN 11    Creatinine 0.56    EGFR 130.1    Sodium 140    Potassium 4.6    Chloride 103    Calcium 10.4    Total Protein 6.7    Albumin 4.7    Globulin 2.0    Total Bilirubin 0.2    Alkaline Phosphatase 76    AST (SGOT) 25    ALT (SGPT) 32    Albumin/Globulin Ratio 2.4    BUN/Creatinine Ratio 19.6    Anion Gap 9.0       CBC          7/3/2023    12:38 3/19/2024    15:01   CBC   WBC 9.14  10.19    RBC 4.34  3.71    Hemoglobin 14.1  11.7    Hematocrit 40.7  33.9    MCV 93.8  91.4    MCH 32.5  31.5    MCHC 34.6  34.5    RDW 12.0  12.0    Platelets 237  228      CBC w/diff          7/3/2023    12:38 3/19/2024    15:01   CBC w/Diff   WBC 9.14  10.19    RBC 4.34  3.71    Hemoglobin 14.1  11.7    Hematocrit 40.7  33.9    MCV 93.8  91.4    MCH 32.5  31.5    MCHC 34.6  34.5    RDW 12.0  12.0    Platelets 237  228    Neutrophil Rel % 69.0     Immature Granulocyte Rel % 0.2     Lymphocyte Rel % 22.8     Monocyte Rel % 5.9     Eosinophil Rel % 1.6     Basophil Rel % 0.5         TSH          4/6/2023    12:35 7/3/2023    12:38 3/19/2024    15:01   TSH   TSH 2.330  1.800  3.200      BMP          7/3/2023    12:38   BMP   BUN 11    Creatinine 0.56    Sodium 140    Potassium 4.6    Chloride 103    CO2 28.0    Calcium 10.4              No Images in the past 120 days found..     The above data has been reviewed by NI Alas 03/25/2024 14:28 EDT.          Patient Care Team:  Jenny Cartwright APRN as PCP - General (Nurse Practitioner)  Issa Asencio Sr., MD (Inactive) as Consulting Physician (Obstetrics and Gynecology)  Magaly Moser APRN (Psychiatry)         ASSESSMENT & PLAN    Diagnoses and all orders for this visit:    1. Annual physical exam (Primary)  -     Comprehensive Metabolic Panel; Future  -     CBC & Differential; Future  -     Lipid Panel; Future  -     Cancel: TSH Rfx On Abnormal To Free T4; Future  -     Iron Profile; Future  -     Vitamin B12; Future    2. Anemia, unspecified type  -     CBC & Differential; Future  -     Iron Profile; Future  -     Vitamin B12; Future    3. Fatigue, unspecified type  Comments:  Review of recent labs show anemia, suspected due to patient's prolonged menstruation, will check labs for further evaluation additional  Orders:  -     CBC & Differential; Future  -     Iron  Profile; Future  -     Vitamin B12; Future    4. Class 2 obesity with body mass index (BMI) of 38.0 to 38.9 in adult, unspecified obesity type, unspecified whether serious comorbidity present  Overview:  BMI 35, plan  testing 35 weeks     Assessment & Plan:  Patient's (Body mass index is 38.79 kg/m².) indicates that they are obese (BMI >30) with health conditions that include none . Weight is worsening. BMI  is above average; BMI management plan is completed. We discussed portion control and increasing exercise.       5. Hypothyroidism, unspecified type  Overview:  Thyroid labs checked last week, within normal limits, continue current dose of medication    Orders:  -     Euthyrox 50 MCG tablet; Take 1 tablet by mouth Daily.  Dispense: 90 tablet; Refill: 1         Tobacco Use: Medium Risk (3/25/2024)    Patient History     Smoking Tobacco Use: Former     Smokeless Tobacco Use: Never     Passive Exposure: Not on file       The patient is advised to begin progressive daily aerobic exercise program, follow a low fat, low cholesterol diet, attempt to lose weight, continue current medications, and return for routine annual checkups.      Follow Up     Return in about 6 months (around 2024), or if symptoms worsen or fail to improve.        Patient was given instructions and counseling regarding her condition or for health maintenance advice. Please see specific information pulled into the AVS if appropriate.   I have reviewed information obtained and documented by others and I have confirmed the accuracy of this documented note.    NI Alas

## 2024-03-25 NOTE — ASSESSMENT & PLAN NOTE
Patient's (Body mass index is 38.79 kg/m².) indicates that they are obese (BMI >30) with health conditions that include none . Weight is worsening. BMI  is above average; BMI management plan is completed. We discussed portion control and increasing exercise.

## 2024-04-04 ENCOUNTER — LAB (OUTPATIENT)
Dept: LAB | Facility: HOSPITAL | Age: 24
End: 2024-04-04
Payer: COMMERCIAL

## 2024-04-04 DIAGNOSIS — Z00.00 ANNUAL PHYSICAL EXAM: ICD-10-CM

## 2024-04-04 DIAGNOSIS — D64.9 ANEMIA, UNSPECIFIED TYPE: ICD-10-CM

## 2024-04-04 DIAGNOSIS — R53.83 FATIGUE, UNSPECIFIED TYPE: ICD-10-CM

## 2024-04-04 LAB
ALBUMIN SERPL-MCNC: 4.4 G/DL (ref 3.5–5.2)
ALBUMIN/GLOB SERPL: 1.8 G/DL
ALP SERPL-CCNC: 92 U/L (ref 39–117)
ALT SERPL W P-5'-P-CCNC: 30 U/L (ref 1–33)
ANION GAP SERPL CALCULATED.3IONS-SCNC: 11 MMOL/L (ref 5–15)
AST SERPL-CCNC: 26 U/L (ref 1–32)
BASOPHILS # BLD AUTO: 0.08 10*3/MM3 (ref 0–0.2)
BASOPHILS NFR BLD AUTO: 0.9 % (ref 0–1.5)
BILIRUB SERPL-MCNC: 0.3 MG/DL (ref 0–1.2)
BUN SERPL-MCNC: 12 MG/DL (ref 6–20)
BUN/CREAT SERPL: 13 (ref 7–25)
CALCIUM SPEC-SCNC: 9.3 MG/DL (ref 8.6–10.5)
CHLORIDE SERPL-SCNC: 101 MMOL/L (ref 98–107)
CHOLEST SERPL-MCNC: 232 MG/DL (ref 0–200)
CO2 SERPL-SCNC: 25 MMOL/L (ref 22–29)
CREAT SERPL-MCNC: 0.92 MG/DL (ref 0.57–1)
DEPRECATED RDW RBC AUTO: 38.5 FL (ref 37–54)
EGFRCR SERPLBLD CKD-EPI 2021: 88.8 ML/MIN/1.73
EOSINOPHIL # BLD AUTO: 0.31 10*3/MM3 (ref 0–0.4)
EOSINOPHIL NFR BLD AUTO: 3.5 % (ref 0.3–6.2)
ERYTHROCYTE [DISTWIDTH] IN BLOOD BY AUTOMATED COUNT: 11.9 % (ref 12.3–15.4)
GLOBULIN UR ELPH-MCNC: 2.4 GM/DL
GLUCOSE SERPL-MCNC: 75 MG/DL (ref 65–99)
HCT VFR BLD AUTO: 37.1 % (ref 34–46.6)
HDLC SERPL-MCNC: 42 MG/DL (ref 40–60)
HGB BLD-MCNC: 12.3 G/DL (ref 12–15.9)
IMM GRANULOCYTES # BLD AUTO: 0.02 10*3/MM3 (ref 0–0.05)
IMM GRANULOCYTES NFR BLD AUTO: 0.2 % (ref 0–0.5)
IRON 24H UR-MRATE: 66 MCG/DL (ref 37–145)
IRON SATN MFR SERPL: 11 % (ref 20–50)
LDLC SERPL CALC-MCNC: 150 MG/DL (ref 0–100)
LDLC/HDLC SERPL: 3.49 {RATIO}
LYMPHOCYTES # BLD AUTO: 2.11 10*3/MM3 (ref 0.7–3.1)
LYMPHOCYTES NFR BLD AUTO: 23.6 % (ref 19.6–45.3)
MCH RBC QN AUTO: 29.7 PG (ref 26.6–33)
MCHC RBC AUTO-ENTMCNC: 33.2 G/DL (ref 31.5–35.7)
MCV RBC AUTO: 89.6 FL (ref 79–97)
MONOCYTES # BLD AUTO: 0.48 10*3/MM3 (ref 0.1–0.9)
MONOCYTES NFR BLD AUTO: 5.4 % (ref 5–12)
NEUTROPHILS NFR BLD AUTO: 5.93 10*3/MM3 (ref 1.7–7)
NEUTROPHILS NFR BLD AUTO: 66.4 % (ref 42.7–76)
NRBC BLD AUTO-RTO: 0 /100 WBC (ref 0–0.2)
PLATELET # BLD AUTO: 282 10*3/MM3 (ref 140–450)
PMV BLD AUTO: 10 FL (ref 6–12)
POTASSIUM SERPL-SCNC: 4.6 MMOL/L (ref 3.5–5.2)
PROT SERPL-MCNC: 6.8 G/DL (ref 6–8.5)
RBC # BLD AUTO: 4.14 10*6/MM3 (ref 3.77–5.28)
SODIUM SERPL-SCNC: 137 MMOL/L (ref 136–145)
TIBC SERPL-MCNC: 577 MCG/DL (ref 298–536)
TRANSFERRIN SERPL-MCNC: 387 MG/DL (ref 200–360)
TRIGL SERPL-MCNC: 218 MG/DL (ref 0–150)
VIT B12 BLD-MCNC: 836 PG/ML (ref 211–946)
VLDLC SERPL-MCNC: 40 MG/DL (ref 5–40)
WBC NRBC COR # BLD AUTO: 8.93 10*3/MM3 (ref 3.4–10.8)

## 2024-04-04 PROCEDURE — 84466 ASSAY OF TRANSFERRIN: CPT

## 2024-04-04 PROCEDURE — 80053 COMPREHEN METABOLIC PANEL: CPT

## 2024-04-04 PROCEDURE — 85025 COMPLETE CBC W/AUTO DIFF WBC: CPT

## 2024-04-04 PROCEDURE — 80061 LIPID PANEL: CPT

## 2024-04-04 PROCEDURE — 82607 VITAMIN B-12: CPT

## 2024-04-04 PROCEDURE — 83540 ASSAY OF IRON: CPT

## 2024-04-04 PROCEDURE — 36415 COLL VENOUS BLD VENIPUNCTURE: CPT

## 2024-04-08 DIAGNOSIS — D64.9 ANEMIA, UNSPECIFIED TYPE: Primary | ICD-10-CM

## 2024-04-08 RX ORDER — FERROUS GLUCONATE 324(38)MG
324 TABLET ORAL
Qty: 90 TABLET | Refills: 1 | Status: SHIPPED | OUTPATIENT
Start: 2024-04-08

## 2024-04-23 ENCOUNTER — PREP FOR SURGERY (OUTPATIENT)
Dept: OTHER | Facility: HOSPITAL | Age: 24
End: 2024-04-23
Payer: COMMERCIAL

## 2024-04-23 ENCOUNTER — OFFICE VISIT (OUTPATIENT)
Dept: OBSTETRICS AND GYNECOLOGY | Facility: CLINIC | Age: 24
End: 2024-04-23
Payer: COMMERCIAL

## 2024-04-23 VITALS
DIASTOLIC BLOOD PRESSURE: 80 MMHG | SYSTOLIC BLOOD PRESSURE: 104 MMHG | HEIGHT: 63 IN | BODY MASS INDEX: 38.8 KG/M2 | WEIGHT: 219 LBS

## 2024-04-23 DIAGNOSIS — N84.0 ENDOMETRIAL POLYP: Primary | ICD-10-CM

## 2024-04-23 RX ORDER — SODIUM CHLORIDE 0.9 % (FLUSH) 0.9 %
10 SYRINGE (ML) INJECTION AS NEEDED
OUTPATIENT
Start: 2024-04-23

## 2024-04-23 RX ORDER — SODIUM CHLORIDE 9 MG/ML
40 INJECTION, SOLUTION INTRAVENOUS AS NEEDED
OUTPATIENT
Start: 2024-04-23

## 2024-04-23 RX ORDER — SODIUM CHLORIDE 0.9 % (FLUSH) 0.9 %
3 SYRINGE (ML) INJECTION EVERY 12 HOURS SCHEDULED
OUTPATIENT
Start: 2024-04-23

## 2024-04-23 NOTE — PROGRESS NOTES
US only visit, see US report for details.     Electronically signed by:    Precious Mendoza DO  04/23/24  11:20 EDT

## 2024-06-21 ENCOUNTER — ANESTHESIA (OUTPATIENT)
Dept: PERIOP | Facility: HOSPITAL | Age: 24
End: 2024-06-21
Payer: COMMERCIAL

## 2024-06-21 ENCOUNTER — HOSPITAL ENCOUNTER (OUTPATIENT)
Facility: HOSPITAL | Age: 24
Setting detail: HOSPITAL OUTPATIENT SURGERY
Discharge: HOME | End: 2024-06-21
Attending: OBSTETRICS & GYNECOLOGY | Admitting: OBSTETRICS & GYNECOLOGY
Payer: COMMERCIAL

## 2024-06-21 ENCOUNTER — ANESTHESIA EVENT (OUTPATIENT)
Dept: PERIOP | Facility: HOSPITAL | Age: 24
End: 2024-06-21
Payer: COMMERCIAL

## 2024-06-21 VITALS
HEIGHT: 63 IN | BODY MASS INDEX: 39.22 KG/M2 | OXYGEN SATURATION: 96 % | TEMPERATURE: 97 F | SYSTOLIC BLOOD PRESSURE: 120 MMHG | WEIGHT: 221.34 LBS | DIASTOLIC BLOOD PRESSURE: 65 MMHG | RESPIRATION RATE: 16 BRPM | HEART RATE: 75 BPM

## 2024-06-21 DIAGNOSIS — N84.0 ENDOMETRIAL POLYP: ICD-10-CM

## 2024-06-21 LAB
BASOPHILS # BLD AUTO: 0.06 10*3/MM3 (ref 0–0.2)
BASOPHILS NFR BLD AUTO: 0.6 % (ref 0–1.5)
DEPRECATED RDW RBC AUTO: 45.1 FL (ref 37–54)
EOSINOPHIL # BLD AUTO: 0.3 10*3/MM3 (ref 0–0.4)
EOSINOPHIL NFR BLD AUTO: 2.9 % (ref 0.3–6.2)
ERYTHROCYTE [DISTWIDTH] IN BLOOD BY AUTOMATED COUNT: 13.8 % (ref 12.3–15.4)
HCT VFR BLD AUTO: 37.2 % (ref 34–46.6)
HGB BLD-MCNC: 12.3 G/DL (ref 12–15.9)
IMM GRANULOCYTES # BLD AUTO: 0.03 10*3/MM3 (ref 0–0.05)
IMM GRANULOCYTES NFR BLD AUTO: 0.3 % (ref 0–0.5)
LYMPHOCYTES # BLD AUTO: 2.53 10*3/MM3 (ref 0.7–3.1)
LYMPHOCYTES NFR BLD AUTO: 24.4 % (ref 19.6–45.3)
MCH RBC QN AUTO: 29.9 PG (ref 26.6–33)
MCHC RBC AUTO-ENTMCNC: 33.1 G/DL (ref 31.5–35.7)
MCV RBC AUTO: 90.5 FL (ref 79–97)
MONOCYTES # BLD AUTO: 0.48 10*3/MM3 (ref 0.1–0.9)
MONOCYTES NFR BLD AUTO: 4.6 % (ref 5–12)
NEUTROPHILS NFR BLD AUTO: 6.97 10*3/MM3 (ref 1.7–7)
NEUTROPHILS NFR BLD AUTO: 67.2 % (ref 42.7–76)
NRBC BLD AUTO-RTO: 0 /100 WBC (ref 0–0.2)
PLATELET # BLD AUTO: 259 10*3/MM3 (ref 140–450)
PMV BLD AUTO: 9.5 FL (ref 6–12)
RBC # BLD AUTO: 4.11 10*6/MM3 (ref 3.77–5.28)
WBC NRBC COR # BLD AUTO: 10.37 10*3/MM3 (ref 3.4–10.8)

## 2024-06-21 PROCEDURE — 25010000002 MIDAZOLAM PER 1MG: Performed by: ANESTHESIOLOGY

## 2024-06-21 PROCEDURE — 88305 TISSUE EXAM BY PATHOLOGIST: CPT | Performed by: OBSTETRICS & GYNECOLOGY

## 2024-06-21 PROCEDURE — 85025 COMPLETE CBC W/AUTO DIFF WBC: CPT | Performed by: OBSTETRICS & GYNECOLOGY

## 2024-06-21 PROCEDURE — 25010000002 DEXAMETHASONE PER 1 MG: Performed by: NURSE ANESTHETIST, CERTIFIED REGISTERED

## 2024-06-21 PROCEDURE — 58558 HYSTEROSCOPY BIOPSY: CPT | Performed by: OBSTETRICS & GYNECOLOGY

## 2024-06-21 PROCEDURE — 25010000002 PROPOFOL 10 MG/ML EMULSION: Performed by: NURSE ANESTHETIST, CERTIFIED REGISTERED

## 2024-06-21 PROCEDURE — 25010000002 ONDANSETRON PER 1 MG: Performed by: NURSE ANESTHETIST, CERTIFIED REGISTERED

## 2024-06-21 PROCEDURE — 25010000002 FENTANYL CITRATE (PF) 50 MCG/ML SOLUTION: Performed by: NURSE ANESTHETIST, CERTIFIED REGISTERED

## 2024-06-21 PROCEDURE — C1782 MORCELLATOR: HCPCS | Performed by: OBSTETRICS & GYNECOLOGY

## 2024-06-21 PROCEDURE — 25010000002 KETOROLAC TROMETHAMINE PER 15 MG: Performed by: NURSE ANESTHETIST, CERTIFIED REGISTERED

## 2024-06-21 PROCEDURE — 25010000002 GLYCOPYRROLATE 0.2 MG/ML SOLUTION: Performed by: NURSE ANESTHETIST, CERTIFIED REGISTERED

## 2024-06-21 PROCEDURE — 25810000003 LACTATED RINGERS PER 1000 ML: Performed by: ANESTHESIOLOGY

## 2024-06-21 RX ORDER — SCOLOPAMINE TRANSDERMAL SYSTEM 1 MG/1
1 PATCH, EXTENDED RELEASE TRANSDERMAL ONCE
Status: DISCONTINUED | OUTPATIENT
Start: 2024-06-21 | End: 2024-06-21 | Stop reason: HOSPADM

## 2024-06-21 RX ORDER — KETOROLAC TROMETHAMINE 30 MG/ML
INJECTION, SOLUTION INTRAMUSCULAR; INTRAVENOUS AS NEEDED
Status: DISCONTINUED | OUTPATIENT
Start: 2024-06-21 | End: 2024-06-21 | Stop reason: SURG

## 2024-06-21 RX ORDER — LIDOCAINE HYDROCHLORIDE 20 MG/ML
INJECTION, SOLUTION EPIDURAL; INFILTRATION; INTRACAUDAL; PERINEURAL AS NEEDED
Status: DISCONTINUED | OUTPATIENT
Start: 2024-06-21 | End: 2024-06-21 | Stop reason: SURG

## 2024-06-21 RX ORDER — PROMETHAZINE HYDROCHLORIDE 25 MG/1
25 SUPPOSITORY RECTAL ONCE AS NEEDED
Status: DISCONTINUED | OUTPATIENT
Start: 2024-06-21 | End: 2024-06-21 | Stop reason: HOSPADM

## 2024-06-21 RX ORDER — ONDANSETRON 2 MG/ML
4 INJECTION INTRAMUSCULAR; INTRAVENOUS ONCE AS NEEDED
Status: DISCONTINUED | OUTPATIENT
Start: 2024-06-21 | End: 2024-06-21 | Stop reason: HOSPADM

## 2024-06-21 RX ORDER — MEPERIDINE HYDROCHLORIDE 25 MG/ML
12.5 INJECTION INTRAMUSCULAR; INTRAVENOUS; SUBCUTANEOUS
Status: DISCONTINUED | OUTPATIENT
Start: 2024-06-21 | End: 2024-06-21 | Stop reason: HOSPADM

## 2024-06-21 RX ORDER — SODIUM CHLORIDE, SODIUM LACTATE, POTASSIUM CHLORIDE, CALCIUM CHLORIDE 600; 310; 30; 20 MG/100ML; MG/100ML; MG/100ML; MG/100ML
9 INJECTION, SOLUTION INTRAVENOUS CONTINUOUS PRN
Status: DISCONTINUED | OUTPATIENT
Start: 2024-06-21 | End: 2024-06-21 | Stop reason: HOSPADM

## 2024-06-21 RX ORDER — ONDANSETRON 2 MG/ML
INJECTION INTRAMUSCULAR; INTRAVENOUS AS NEEDED
Status: DISCONTINUED | OUTPATIENT
Start: 2024-06-21 | End: 2024-06-21 | Stop reason: SURG

## 2024-06-21 RX ORDER — MIDAZOLAM HYDROCHLORIDE 2 MG/2ML
2 INJECTION, SOLUTION INTRAMUSCULAR; INTRAVENOUS ONCE
Status: COMPLETED | OUTPATIENT
Start: 2024-06-21 | End: 2024-06-21

## 2024-06-21 RX ORDER — PROPOFOL 10 MG/ML
VIAL (ML) INTRAVENOUS AS NEEDED
Status: DISCONTINUED | OUTPATIENT
Start: 2024-06-21 | End: 2024-06-21 | Stop reason: SURG

## 2024-06-21 RX ORDER — SODIUM CHLORIDE 0.9 % (FLUSH) 0.9 %
3 SYRINGE (ML) INJECTION EVERY 12 HOURS SCHEDULED
Status: DISCONTINUED | OUTPATIENT
Start: 2024-06-21 | End: 2024-06-21 | Stop reason: HOSPADM

## 2024-06-21 RX ORDER — MAGNESIUM HYDROXIDE 1200 MG/15ML
LIQUID ORAL AS NEEDED
Status: DISCONTINUED | OUTPATIENT
Start: 2024-06-21 | End: 2024-06-21 | Stop reason: HOSPADM

## 2024-06-21 RX ORDER — GLYCOPYRROLATE 0.2 MG/ML
INJECTION INTRAMUSCULAR; INTRAVENOUS AS NEEDED
Status: DISCONTINUED | OUTPATIENT
Start: 2024-06-21 | End: 2024-06-21 | Stop reason: SURG

## 2024-06-21 RX ORDER — ACETAMINOPHEN 500 MG
1000 TABLET ORAL ONCE
Status: COMPLETED | OUTPATIENT
Start: 2024-06-21 | End: 2024-06-21

## 2024-06-21 RX ORDER — OXYCODONE HYDROCHLORIDE 5 MG/1
5 TABLET ORAL
Status: DISCONTINUED | OUTPATIENT
Start: 2024-06-21 | End: 2024-06-21 | Stop reason: HOSPADM

## 2024-06-21 RX ORDER — SODIUM CHLORIDE 9 MG/ML
40 INJECTION, SOLUTION INTRAVENOUS AS NEEDED
Status: DISCONTINUED | OUTPATIENT
Start: 2024-06-21 | End: 2024-06-21 | Stop reason: HOSPADM

## 2024-06-21 RX ORDER — EPHEDRINE SULFATE 50 MG/ML
INJECTION INTRAVENOUS AS NEEDED
Status: DISCONTINUED | OUTPATIENT
Start: 2024-06-21 | End: 2024-06-21 | Stop reason: SURG

## 2024-06-21 RX ORDER — FENTANYL CITRATE 50 UG/ML
INJECTION, SOLUTION INTRAMUSCULAR; INTRAVENOUS AS NEEDED
Status: DISCONTINUED | OUTPATIENT
Start: 2024-06-21 | End: 2024-06-21 | Stop reason: SURG

## 2024-06-21 RX ORDER — SODIUM CHLORIDE 0.9 % (FLUSH) 0.9 %
10 SYRINGE (ML) INJECTION AS NEEDED
Status: DISCONTINUED | OUTPATIENT
Start: 2024-06-21 | End: 2024-06-21 | Stop reason: HOSPADM

## 2024-06-21 RX ORDER — PROMETHAZINE HYDROCHLORIDE 12.5 MG/1
25 TABLET ORAL ONCE AS NEEDED
Status: DISCONTINUED | OUTPATIENT
Start: 2024-06-21 | End: 2024-06-21 | Stop reason: HOSPADM

## 2024-06-21 RX ORDER — DEXAMETHASONE SODIUM PHOSPHATE 4 MG/ML
INJECTION, SOLUTION INTRA-ARTICULAR; INTRALESIONAL; INTRAMUSCULAR; INTRAVENOUS; SOFT TISSUE AS NEEDED
Status: DISCONTINUED | OUTPATIENT
Start: 2024-06-21 | End: 2024-06-21 | Stop reason: SURG

## 2024-06-21 RX ADMIN — DEXAMETHASONE SODIUM PHOSPHATE 8 MG: 4 INJECTION, SOLUTION INTRAMUSCULAR; INTRAVENOUS at 08:17

## 2024-06-21 RX ADMIN — SCOPALAMINE 1 PATCH: 1 PATCH, EXTENDED RELEASE TRANSDERMAL at 07:34

## 2024-06-21 RX ADMIN — PROPOFOL 200 MG: 10 INJECTION, EMULSION INTRAVENOUS at 08:12

## 2024-06-21 RX ADMIN — EPHEDRINE SULFATE 10 MG: 50 INJECTION INTRAVENOUS at 08:23

## 2024-06-21 RX ADMIN — FENTANYL CITRATE 50 MCG: 50 INJECTION, SOLUTION INTRAMUSCULAR; INTRAVENOUS at 08:12

## 2024-06-21 RX ADMIN — LIDOCAINE HYDROCHLORIDE 50 MG: 20 INJECTION, SOLUTION INTRAVENOUS at 08:21

## 2024-06-21 RX ADMIN — PROPOFOL 100 MG: 10 INJECTION, EMULSION INTRAVENOUS at 08:43

## 2024-06-21 RX ADMIN — KETOROLAC TROMETHAMINE 30 MG: 30 INJECTION, SOLUTION INTRAMUSCULAR; INTRAVENOUS at 08:50

## 2024-06-21 RX ADMIN — FENTANYL CITRATE 50 MCG: 50 INJECTION, SOLUTION INTRAMUSCULAR; INTRAVENOUS at 08:43

## 2024-06-21 RX ADMIN — LIDOCAINE HYDROCHLORIDE 50 MG: 20 INJECTION, SOLUTION INTRAVENOUS at 08:12

## 2024-06-21 RX ADMIN — ACETAMINOPHEN 1000 MG: 500 TABLET ORAL at 07:34

## 2024-06-21 RX ADMIN — SODIUM CHLORIDE, SODIUM LACTATE, POTASSIUM CHLORIDE, AND CALCIUM CHLORIDE 9 ML/HR: .6; .31; .03; .02 INJECTION, SOLUTION INTRAVENOUS at 07:34

## 2024-06-21 RX ADMIN — MIDAZOLAM HYDROCHLORIDE 2 MG: 1 INJECTION, SOLUTION INTRAMUSCULAR; INTRAVENOUS at 07:50

## 2024-06-21 RX ADMIN — GLYCOPYRROLATE 0.2 MG: 0.2 INJECTION INTRAMUSCULAR; INTRAVENOUS at 08:21

## 2024-06-21 RX ADMIN — ONDANSETRON HYDROCHLORIDE 4 MG: 2 SOLUTION INTRAMUSCULAR; INTRAVENOUS at 08:17

## 2024-06-21 NOTE — OP NOTE
Pre-Op diagnosis: Abnormal Uterine Bleeding, thickened endometrium     Post-Op diagnosis:  Same     Procedure:  Dilation & Curettage, Hysteroscopy (Extraction of the endometrium), Myosure     Surgeon:  Precious Mendoza DO     Assistant: Sailaja Ayers  was responsible for performing the following activities: Retraction and their skilled assistance was necessary for the success of this case.      Anesthesia:  Reshma Coronado CRNA    Anesthesia Type:  General    EBL:  Minimal    Specimen:  Endometrial curettings to pathology    Complications:  None    Disposition:  To Recovery Room in stable condition    Findings:  thickened uterine lining     After general anesthesia was administered, the patient was placed in candy cane stirrups. She was then prepped and draped in a normal sterile fashion.      A weighted speculum was placed in the patient's vagina, and the anterior lip of the cervix was grasped with a single-tooth tenaculum  clamp.  The cervix was dilated using david dilators to a size 5mm  The hysteroscope was inserted.  The distension medium was Normal Saline.    The findings are noted above.    Myosure lite placed into scope and used for 2 minutes and 22 seconds with a  deficit of 95ml.     All instruments were removed from the patient's vagina.  Sponge, lap, and instrument counts are correct times 2.  The patient was taken to the recovery room awake and in stable condition.    Her surgery went well and she is stable.  All of her discharge instructions have been given to her personally by me.  She is to have vaginal rest for  weeks.  No lifting anything heavier than 10 lb until it is not painful.  No driving while taking pain medications. She will follow up with me in 2 weeks.      Electronically signed by:    Precious Mendoza DO  06/21/24  08:45 EDT

## 2024-06-21 NOTE — DISCHARGE INSTRUCTIONS
DISCHARGE INSTRUCTIONS  GYNECOLOGICAL  PROCEDURES      For your surgery you had:  Monitored anesthesia care  You received a medicated patch for nausea prevention today (behind your ear). It is recommended that you remove it 24-48 hours post-operatively. It must be removed within 72 hours.  You may experience dizziness, drowsiness, or lightheadedness for several hours following surgery.  Do not stay alone today or tonight.  Limit your activity for 24 hours.  Resume your diet slowly.  Follow any special dietary instructions you may have been given by your doctor.  You should not drive or operate machinery, drink alcohol, or sign legally binding documents for 24 hours or while you are taking pain medication.    NOTIFY YOUR DOCTOR IF YOU EXPERIENCE ANY OF THE FOLLOWING:  Temperature greater than 101 degrees Fahrenheit  Shaking Chills  Redness or excessive drainage from incision  Nausea, vomiting and/or pain that is not controlled by prescribed medications  Increase in bleeding or bleeding that is excessive  Unable to urinate in 6 hours after surgery  If unable to reach your doctor, please go to the closest Emergency Room [x] Nothing in the vagina until cleared at follow up to include intercourse, douches, or tampons.  [x] Vaginal bleeding may be expected for several days with flow decreasing with time and never any heavier than a normal period.  If you have foul smelling discharge, notify your physician.      SPECIAL INSTRUCTIONS:  Follow any verbal instructions given by Dr. Mendoza.  No tub baths x2 weeks.      Last dose of pain medication was given at:  Tylenol (1000mg) last at 7:34am. Do not exceed 4000mg of tylenol in a 24 hour period.  Toradol last at 8:50am. May take ibuprofen next at 2:50pm if able and needed.

## 2024-06-21 NOTE — H&P
GYN HISTORY & PHYSICAL      Patient Name: Carmina Son  : 2000  MRN: [id number]    Subjective     Chief Complaint: AUB       HPI:  24 y.o.  No LMP recorded. (Menstrual status: Tubal ligation).. Patient here today to undergo Hysteroscopy, dilation and curettage, myosure for endometrial polyps.   She wishes to proceed with the above procedure. She denies any F/C, D/C, N/V, CP or SOB.     Risks and benefits and alternatives of surgery were discussed with patient.  Risks are not limited to anesthesia, bleeding, blood transfusion, infection, damage to surrounding organs, wound separation, re-operation, thromboembolic disease, death.            ROS: Review of Systems   Constitutional: Negative.    HENT: Negative.     Eyes: Negative.    Respiratory: Negative.     Cardiovascular: Negative.    Gastrointestinal: Negative.    Endocrine: Negative.    Genitourinary:  Positive for vaginal bleeding.   Musculoskeletal: Negative.    Allergic/Immunologic: Negative.    Neurological: Negative.    Hematological: Negative.    Psychiatric/Behavioral: Negative.           Personal History     PMHx:    Past Medical History:   Diagnosis Date    Abnormal Pap smear of cervix     Anemia     Anxiety     Depression     Encounter for sterilization     Hypothyroidism     Migraine     Trauma        OBHx:   OB History    Para Term  AB Living   3 3 3     3   SAB IAB Ectopic Molar Multiple Live Births             3      # Outcome Date GA Lbr Trino/2nd Weight Sex Type Anes PTL Lv   3 Term 23 38w5d / 168:31 4320 g (9 lb 8.4 oz) F Vag-Spont EPI N ROSELYN      Complications: Shoulder Dystocia   2 Term 21 38w0d  4848 g (10 lb 11 oz) F Vag-Spont EPI N ROSELYN      Complications: Shoulder Dystocia   1 Term 17 37w0d  3771 g (8 lb 5 oz) M Vag-Spont EPI N ROSELYN        Home Medications:  Cariprazine HCl, FLUoxetine, ferrous gluconate, and levothyroxine    Allergies:  No Known Allergies    PSHx:   Past Surgical History:    Procedure Laterality Date    CHOLECYSTECTOMY      COLONOSCOPY      COLONOSCOPY N/A 2022    Procedure: COLONOSCOPY with random biopsies;  Surgeon: Allegra Capellan MD;  Location: [facility] ENDOSCOPY;  Service: Gastroenterology;  Laterality: N/A;  normal    ENDOSCOPY      ENDOSCOPY N/A 2022    Procedure: ESOPHAGOGASTRODUODENOSCOPY with biopsies;  Surgeon: Allegra Capellan MD;  Location: [facility] ENDOSCOPY;  Service: Gastroenterology;  Laterality: N/A;  gastritis and HH    HYSTEROSCOPY      SALPINGECTOMY Bilateral 2023    Procedure: SALPINGECTOMY LAPAROSCOPIC BILATERAL;  Surgeon: Precious Mendoza DO;  [place] [facility];  Service: Gynecology;  Laterality: Bilateral;    TONSILLECTOMY      UPPER GASTROINTESTINAL ENDOSCOPY      WISDOM TOOTH EXTRACTION         Social History:    reports that she quit smoking about 4 years ago. Her smoking use included cigarettes and electronic cigarette. She started smoking about 4 years ago. She has a 0.1 pack-year smoking history. She has never used smokeless tobacco. She reports that she does not currently use alcohol. She reports that she does not use drugs.      Objective     Vitals:   Temp:  [97 °F (36.1 °C)] 97 °F (36.1 °C)  Heart Rate:  [64] 64  Resp:  [18] 18  BP: (118)/(62) 118/62    PHYSICAL EXAM:   General- NAD, alert and oriented, appropriate  Psych- Normal mood, good memory  CV- Regular rhythm, no murnurs  Resp- CTA to bases, no wheezes  Abdomen- NABS, soft, non distended, non tender, no masses  Pelvic- Defer to OR     Lab/Imaging/Other:    Study Result    [facility]  Obstetrics and Gynecology  [place]      Ultrasound: 24     Patient:  Carmina Son    MR#:[id number]     23 y.o.   for GYN US for AUB      Comparison: none     Method: TVUS     Findings:  Uterus: 9cm X 5cm X 4cm   Endometrium: 15mm with two endometrial polyps   Ovaries: Normal blood flow bilaterally, small psychological cysts  bilaterally         Interpretation:  Endometrial polyps  Desires removal with hysteroscopy, D&C with endometrial ablation   See media report for details of US            Electronically signed by Precious Mendoza DO, 04/23/24, 11:21 AM EDT.      Assessment / Plan     Assessment:  AUB  Endometrial polyps    Plan:   Hysteroscopy, dilation and curettage, Myosure   Patient to follow up in office in 2 weeks for post op visit. All questions and concerns have been answered.       Counseling: Risks and benefits and alternatives of surgery were discussed with patient.  Risks are not limited to anesthesia, bleeding, blood transfusion, infection, damage to surrounding organs, wound separation, re-operation, thromboembolic disease, death.  See separate written and signed consent for surgical specific risks and benefits.        Signature:   Electronically signed by:    Precious Mendoza DO  06/21/24  07:05 EDT

## 2024-06-21 NOTE — ANESTHESIA POSTPROCEDURE EVALUATION
Patient: Carmina Son    Procedure Summary       Date: 06/21/24 Room / Location: [facility] OR 01 / [facility] MAIN OR    Anesthesia Start: 0807 Anesthesia Stop: 0859    Procedure: DILATATION AND CURETTAGE HYSTEROSCOPY WITH MYOSURE (Vagina) Diagnosis:       Endometrial polyp      (Endometrial polyp [N84.0])    Surgeons: Precious Mendoza DO Provider: Reshma Coronado CRNA    Anesthesia Type: general ASA Status: 2            Anesthesia Type: general    Vitals  Vitals Value Taken Time   /51 06/21/24 0930   Temp 36.6 °C (97.8 °F) 06/21/24 0925   Pulse 82 06/21/24 0930   Resp 14 06/21/24 0930   SpO2 92 % 06/21/24 0930           Post Anesthesia Care and Evaluation    Patient location during evaluation: bedside  Patient participation: complete - patient participated  Level of consciousness: awake and alert  Pain management: adequate    Airway patency: patent  Anesthetic complications: No anesthetic complications  PONV Status: none  Cardiovascular status: acceptable  Respiratory status: acceptable  Hydration status: acceptable    Comments: An Anesthesiologist personally participated in the most demanding procedures (including induction and emergence if applicable) in the anesthesia plan, monitored the course of anesthesia administration at frequent intervals and remained physically present and available for immediate diagnosis and treatment of emergencies.

## 2024-06-21 NOTE — ANESTHESIA PREPROCEDURE EVALUATION
Anesthesia Evaluation     Patient summary reviewed and Nursing notes reviewed   no history of anesthetic complications:   NPO Solid Status: > 8 hours  NPO Liquid Status: > 2 hours           Airway   Mallampati: II  TM distance: >3 FB  Neck ROM: full  No difficulty expected  Dental      Pulmonary - negative pulmonary ROS and normal exam    breath sounds clear to auscultation  Cardiovascular - negative cardio ROS and normal exam  Exercise tolerance: good (4-7 METS)    Rhythm: regular  Rate: normal        Neuro/Psych  (+) headaches, psychiatric history Anxiety and Depression  GI/Hepatic/Renal/Endo    (+) obesity, thyroid problem hypothyroidism    Musculoskeletal (-) negative ROS    Abdominal    Substance History - negative use     OB/GYN negative ob/gyn ROS         Other - negative ROS       ROS/Med Hx Other: PAT Nursing Notes unavailable.                     Anesthesia Plan    ASA 2     general     (LMA good)    Anesthetic plan, risks, benefits, and alternatives have been provided, discussed and informed consent has been obtained with: patient and spouse/significant other.  Pre-procedure education provided  Plan discussed with CRNA.        CODE STATUS:

## 2024-07-03 ENCOUNTER — OFFICE VISIT (OUTPATIENT)
Dept: OBSTETRICS AND GYNECOLOGY | Facility: CLINIC | Age: 24
End: 2024-07-03
Payer: COMMERCIAL

## 2024-07-03 VITALS
DIASTOLIC BLOOD PRESSURE: 76 MMHG | BODY MASS INDEX: 39.16 KG/M2 | WEIGHT: 221 LBS | SYSTOLIC BLOOD PRESSURE: 108 MMHG | HEIGHT: 63 IN

## 2024-07-03 DIAGNOSIS — Z09 POSTOPERATIVE FOLLOW-UP: Primary | ICD-10-CM

## 2024-07-03 PROCEDURE — 99024 POSTOP FOLLOW-UP VISIT: CPT | Performed by: OBSTETRICS & GYNECOLOGY

## 2024-07-03 NOTE — PROGRESS NOTES
"Post Operative Visit        Chief Complaint   Patient presents with    Post-op     DILATATION AND CURETTAGE HYSTEROSCOPY WITH MYOSURE       HPI:   Carmina Son is here for a post op visit.  She had a Hysteroscopy, D&C, myosure and has no complaints.  We have gone over her pathology and any available pictures and all questions have been answered.    Pain:  No  Vaginal bleeding:  No  Vaginal discharge:  No  Fever/chills:  No  Good appetite:  Yes  Normal bladder function:  Yes  Normal bowel function:  No  Hot flashes: No    PHYSICAL EXAM:  /76   Ht 160 cm (63\")   Wt 100 kg (221 lb)   LMP 05/12/2024 (Exact Date)   BMI 39.15 kg/m²   General- NAD, alert and oriented, appropriate  Psych- Normal mood, good memory  Abdomen- Soft, non distended, non tender, no masses  Lymphatic- No palpable groin nodes  Ext- No edema, no cyanosis   Skin- No lesions, no rashes        ASSESSMENT and PLAN:  Post-operative exam    Diagnoses and all orders for this visit:    1. Postoperative follow-up (Primary)        Operative report, surgical findings and any pathology/photos reviewed.  All questions answered.     Counseling:   Ok to resume normal activities  Ok to resume intercourse  Return to school/work without limitations      Follow Up:  No follow-ups on file.    I spent 20 minutes on the separately reported service of Post op. This time is not included in the time used to support the E/M service also reported today.        Precious Mendoza DO  07/03/2024    [facility]  [facility] [place] [address]  Dept: [phone]  Dept Fax: [phone]  Loc: [phone]  Loc Fax: [phone]     "

## 2024-07-10 ENCOUNTER — TELEPHONE (OUTPATIENT)
Dept: FAMILY MEDICINE CLINIC | Facility: CLINIC | Age: 24
End: 2024-07-10
Payer: COMMERCIAL

## 2024-07-10 NOTE — TELEPHONE ENCOUNTER
OK FOR HUB TO READ/RELAY  Unable to reach the patient regarding overdue lab orders: Iron Profile [SMB840], Vitamin B12 & Folate [[id number]], Ferritin [LAB68], CBC w AUTO Differential [OTQ574]. Extending the orders for a month so the patient can go get these done at any time.

## 2024-07-12 ENCOUNTER — LAB (OUTPATIENT)
Dept: LAB | Facility: HOSPITAL | Age: 24
End: 2024-07-12
Payer: COMMERCIAL

## 2024-07-12 DIAGNOSIS — D64.9 ANEMIA, UNSPECIFIED TYPE: ICD-10-CM

## 2024-07-12 LAB
BASOPHILS # BLD AUTO: 0.05 10*3/MM3 (ref 0–0.2)
BASOPHILS NFR BLD AUTO: 0.5 % (ref 0–1.5)
DEPRECATED RDW RBC AUTO: 44.1 FL (ref 37–54)
EOSINOPHIL # BLD AUTO: 0.25 10*3/MM3 (ref 0–0.4)
EOSINOPHIL NFR BLD AUTO: 2.7 % (ref 0.3–6.2)
ERYTHROCYTE [DISTWIDTH] IN BLOOD BY AUTOMATED COUNT: 13.7 % (ref 12.3–15.4)
FERRITIN SERPL-MCNC: 20.1 NG/ML (ref 13–150)
FOLATE SERPL-MCNC: 8.04 NG/ML (ref 4.78–24.2)
HCT VFR BLD AUTO: 37.6 % (ref 34–46.6)
HGB BLD-MCNC: 12.7 G/DL (ref 12–15.9)
IMM GRANULOCYTES # BLD AUTO: 0.02 10*3/MM3 (ref 0–0.05)
IMM GRANULOCYTES NFR BLD AUTO: 0.2 % (ref 0–0.5)
IRON 24H UR-MRATE: 117 MCG/DL (ref 37–145)
IRON SATN MFR SERPL: 22 % (ref 20–50)
LYMPHOCYTES # BLD AUTO: 2.43 10*3/MM3 (ref 0.7–3.1)
LYMPHOCYTES NFR BLD AUTO: 26.6 % (ref 19.6–45.3)
MCH RBC QN AUTO: 30 PG (ref 26.6–33)
MCHC RBC AUTO-ENTMCNC: 33.8 G/DL (ref 31.5–35.7)
MCV RBC AUTO: 88.7 FL (ref 79–97)
MONOCYTES # BLD AUTO: 0.6 10*3/MM3 (ref 0.1–0.9)
MONOCYTES NFR BLD AUTO: 6.6 % (ref 5–12)
NEUTROPHILS NFR BLD AUTO: 5.79 10*3/MM3 (ref 1.7–7)
NEUTROPHILS NFR BLD AUTO: 63.4 % (ref 42.7–76)
NRBC BLD AUTO-RTO: 0 /100 WBC (ref 0–0.2)
PLATELET # BLD AUTO: 269 10*3/MM3 (ref 140–450)
PMV BLD AUTO: 10.1 FL (ref 6–12)
RBC # BLD AUTO: 4.24 10*6/MM3 (ref 3.77–5.28)
TIBC SERPL-MCNC: 542 MCG/DL (ref 298–536)
TRANSFERRIN SERPL-MCNC: 364 MG/DL (ref 200–360)
VIT B12 BLD-MCNC: 688 PG/ML (ref 211–946)
WBC NRBC COR # BLD AUTO: 9.14 10*3/MM3 (ref 3.4–10.8)

## 2024-07-12 PROCEDURE — 84466 ASSAY OF TRANSFERRIN: CPT

## 2024-07-12 PROCEDURE — 82607 VITAMIN B-12: CPT

## 2024-07-12 PROCEDURE — 82746 ASSAY OF FOLIC ACID SERUM: CPT

## 2024-07-12 PROCEDURE — 85025 COMPLETE CBC W/AUTO DIFF WBC: CPT

## 2024-07-12 PROCEDURE — 83540 ASSAY OF IRON: CPT

## 2024-07-12 PROCEDURE — 36415 COLL VENOUS BLD VENIPUNCTURE: CPT

## 2024-07-12 PROCEDURE — 82728 ASSAY OF FERRITIN: CPT

## 2024-07-23 ENCOUNTER — HOSPITAL ENCOUNTER (OUTPATIENT)
Dept: GENERAL RADIOLOGY | Facility: HOSPITAL | Age: 24
Discharge: HOME | End: 2024-07-23
Admitting: NURSE PRACTITIONER
Payer: COMMERCIAL

## 2024-07-23 ENCOUNTER — OFFICE VISIT (OUTPATIENT)
Dept: FAMILY MEDICINE CLINIC | Facility: CLINIC | Age: 24
End: 2024-07-23
Payer: COMMERCIAL

## 2024-07-23 VITALS
WEIGHT: 223 LBS | HEIGHT: 63 IN | HEART RATE: 83 BPM | BODY MASS INDEX: 39.51 KG/M2 | DIASTOLIC BLOOD PRESSURE: 45 MMHG | OXYGEN SATURATION: 97 % | SYSTOLIC BLOOD PRESSURE: 108 MMHG

## 2024-07-23 DIAGNOSIS — M79.671 PAIN OF RIGHT HEEL: ICD-10-CM

## 2024-07-23 DIAGNOSIS — M79.671 PAIN OF RIGHT HEEL: Primary | ICD-10-CM

## 2024-07-23 DIAGNOSIS — M72.2 PLANTAR FASCIITIS: ICD-10-CM

## 2024-07-23 PROCEDURE — 1159F MED LIST DOCD IN RCRD: CPT | Performed by: NURSE PRACTITIONER

## 2024-07-23 PROCEDURE — 99213 OFFICE O/P EST LOW 20 MIN: CPT | Performed by: NURSE PRACTITIONER

## 2024-07-23 PROCEDURE — 1160F RVW MEDS BY RX/DR IN RCRD: CPT | Performed by: NURSE PRACTITIONER

## 2024-07-23 PROCEDURE — 73650 X-RAY EXAM OF HEEL: CPT

## 2024-07-23 RX ORDER — NAPROXEN 500 MG/1
500 TABLET ORAL 2 TIMES DAILY WITH MEALS
Qty: 30 TABLET | Refills: 0 | Status: SHIPPED | OUTPATIENT
Start: 2024-07-23

## 2024-07-23 NOTE — PROGRESS NOTES
Chief Complaint  Heel Spurs    COY Son presents to [facility] due to right heel pain on going for a few months. Pt states she went to [facility] in [place] recently and was advised she has heel spurs per xray. Pt requesting podiatry referral.     Pt reports onset of pain 2-3 month, states hurts a lot when first getting out of bed, then eases up as she she walks around a while. States sometimes hurts worse again at end of day       History of Present Illness  Past Medical History:   Diagnosis Date    Abnormal Pap smear of cervix     Anemia     Anxiety     Depression     Encounter for sterilization     Hypothyroidism     Migraine     Trauma       Family History   Problem Relation Age of Onset    Stroke Mother     Diabetes Mother     Hypertension Mother     Irritable bowel syndrome Mother     Depression Mother     Drug abuse Mother     Miscarriages / Stillbirths Mother     Thyroid disease Mother     Breast cancer Mother         Pt unsure of age of onset    Diabetes Father     Drug abuse Father     Hypertension Father     Mental illness Father     Stroke Father     Asthma Sister     Asthma Sister     Crohn's disease Maternal Grandmother     Arthritis Paternal Grandmother     Hearing loss Paternal Grandfather     Uterine cancer Neg Hx     Ovarian cancer Neg Hx     Colon cancer Neg Hx     Deep vein thrombosis Neg Hx     Pulmonary embolism Neg Hx     Malig Hyperthermia Neg Hx       Past Surgical History:   Procedure Laterality Date    CHOLECYSTECTOMY      COLONOSCOPY      COLONOSCOPY N/A 02/22/2022    Procedure: COLONOSCOPY with random biopsies;  Surgeon: Allegra Capellan MD;  Location: [facility];  Service: Gastroenterology;  Laterality: N/A;  normal    D & C HYSTEROSCOPY MYOSURE N/A 6/21/2024    Procedure: DILATATION AND CURETTAGE HYSTEROSCOPY WITH MYOSURE;  Surgeon: Precious Mendoza DO;  Location: [facility] OR;  Service: Gynecology;  Laterality: N/A;  "   ENDOSCOPY      ENDOSCOPY N/A 02/22/2022    Procedure: ESOPHAGOGASTRODUODENOSCOPY with biopsies;  Surgeon: Allegra Capellan MD;  Location: [facility] ENDOSCOPY;  Service: Gastroenterology;  Laterality: N/A;  gastritis and HH    HYSTEROSCOPY      SALPINGECTOMY Bilateral 04/14/2023    Procedure: SALPINGECTOMY LAPAROSCOPIC BILATERAL;  Surgeon: Precious Mendoza DO;  Location: [facility] OR [facility];  Service: Gynecology;  Laterality: Bilateral;    TONSILLECTOMY      UPPER GASTROINTESTINAL ENDOSCOPY      WISDOM TOOTH EXTRACTION          Current Outpatient Medications:     Euthyrox 50 MCG tablet, Take 1 tablet by mouth Daily., Disp: 90 tablet, Rfl: 1    FLUoxetine (PROzac) 40 MG capsule, , Disp: , Rfl:     Vraylar 3 MG capsule capsule, , Disp: , Rfl:     ferrous gluconate (FERGON) 324 MG tablet, Take 1 tablet by mouth Daily With Breakfast. (Patient not taking: Reported on 7/23/2024), Disp: 90 tablet, Rfl: 1    naproxen (Naprosyn) 500 MG tablet, Take 1 tablet by mouth 2 (Two) Times a Day With Meals., Disp: 30 tablet, Rfl: 0    OBJECTIVE  Vital Signs:   /45   Pulse 83   Ht 160 cm (63\")   Wt 101 kg (223 lb)   LMP 05/12/2024 (Exact Date)   SpO2 97%   BMI 39.50 kg/m²    Estimated body mass index is 39.5 kg/m² as calculated from the following:    Height as of this encounter: 160 cm (63\").    Weight as of this encounter: 101 kg (223 lb).     Wt Readings from Last 3 Encounters:   07/23/24 101 kg (223 lb)   07/03/24 100 kg (221 lb)   06/21/24 100 kg (221 lb 5.5 oz)     BP Readings from Last 3 Encounters:   07/23/24 108/45   07/03/24 108/76   06/21/24 120/65       Physical Exam  Vitals reviewed.   Constitutional:       Appearance: Normal appearance. She is well-developed.   HENT:      Head: Normocephalic and atraumatic.      Right Ear: External ear normal.      Left Ear: External ear normal.   Eyes:      Conjunctiva/sclera: Conjunctivae normal.      Pupils: Pupils are equal, round, and reactive to light.   Cardiovascular:    "   Rate and Rhythm: Normal rate and regular rhythm.      Heart sounds: No murmur heard.     No friction rub. No gallop.   Pulmonary:      Effort: Pulmonary effort is normal.      Breath sounds: Normal breath sounds. No wheezing or rhonchi.   Feet:      Comments: Point tenderness of right heel  Skin:     General: Skin is warm and dry.   Neurological:      Mental Status: She is alert and oriented to person, place, and time.      Cranial Nerves: No cranial nerve deficit.   Psychiatric:         Mood and Affect: Mood and affect normal.         Behavior: Behavior normal.         Thought Content: Thought content normal.         Judgment: Judgment normal.          Result Review    CMP          4/4/2024    10:51   CMP   Glucose 75    BUN 12    Creatinine 0.92    EGFR 88.8    Sodium 137    Potassium 4.6    Chloride 101    Calcium 9.3    Total Protein 6.8    Albumin 4.4    Globulin 2.4    Total Bilirubin 0.3    Alkaline Phosphatase 92    AST (SGOT) 26    ALT (SGPT) 30    Albumin/Globulin Ratio 1.8    BUN/Creatinine Ratio 13.0    Anion Gap 11.0      CBC          4/4/2024    10:51 6/21/2024    06:58 7/12/2024    16:10   CBC   WBC 8.93  10.37  9.14    RBC 4.14  4.11  4.24    Hemoglobin 12.3  12.3  12.7    Hematocrit 37.1  37.2  37.6    MCV 89.6  90.5  88.7    MCH 29.7  29.9  30.0    MCHC 33.2  33.1  33.8    RDW 11.9  13.8  13.7    Platelets 282  259  269        No Images in the past 120 days found..     The above data has been reviewed by NI Alas 07/23/2024 13:16 EDT.          Patient Care Team:  Jenny Cartwright APRN as PCP - General (Nurse Practitioner)  Issa Asencio Sr., MD (Inactive) as Consulting Physician (Obstetrics and Gynecology)  Magaly Moser APRN (Psychiatry)            ASSESSMENT & PLAN    Diagnoses and all orders for this visit:    1. Pain of right heel (Primary)  -     XR Calcaneus 2+ View Right; Future  -     naproxen (Naprosyn) 500 MG tablet; Take 1 tablet by mouth 2 (Two) Times a Day  With Meals.  Dispense: 30 tablet; Refill: 0  -     Ambulatory Referral to Podiatry    2. Plantar fasciitis  -     XR Calcaneus 2+ View Right; Future  -     naproxen (Naprosyn) 500 MG tablet; Take 1 tablet by mouth 2 (Two) Times a Day With Meals.  Dispense: 30 tablet; Refill: 0  -     Ambulatory Referral to Podiatry    Discussed with patient symptoms are consistent with plantars fasciitis, recommend power step arch supports, will refer to podiatry per patient request, patient had [facility] done at [facility], unable to obtain records, we will kita-ray today    Tobacco Use: Medium Risk (7/23/2024)    Patient History     Smoking Tobacco Use: Former     Smokeless Tobacco Use: Never     Passive Exposure: Not on file       Follow Up     Return if symptoms worsen or fail to improve.        Patient was given instructions and counseling regarding her condition or for health maintenance advice. Please see specific information pulled into the AVS if appropriate.   I have reviewed information obtained and documented by others and I have confirmed the accuracy of this documented note.    Jenny Cartwright, APRN

## 2024-09-29 DIAGNOSIS — E03.9 HYPOTHYROIDISM, UNSPECIFIED TYPE: ICD-10-CM

## 2024-09-30 RX ORDER — LEVOTHYROXINE SODIUM 50 UG/1
50 TABLET ORAL DAILY
Qty: 90 TABLET | Refills: 0 | Status: SHIPPED | OUTPATIENT
Start: 2024-09-30

## 2024-10-21 ENCOUNTER — OFFICE VISIT (OUTPATIENT)
Dept: PODIATRY | Facility: CLINIC | Age: 24
End: 2024-10-21
Payer: COMMERCIAL

## 2024-10-21 VITALS
OXYGEN SATURATION: 97 % | DIASTOLIC BLOOD PRESSURE: 73 MMHG | TEMPERATURE: 98.2 F | HEART RATE: 76 BPM | HEIGHT: 63 IN | SYSTOLIC BLOOD PRESSURE: 110 MMHG | WEIGHT: 217 LBS | BODY MASS INDEX: 38.45 KG/M2

## 2024-10-21 DIAGNOSIS — M79.671 FOOT PAIN, RIGHT: Primary | ICD-10-CM

## 2024-10-21 DIAGNOSIS — M72.2 PLANTAR FASCIITIS: ICD-10-CM

## 2024-10-21 PROCEDURE — 1160F RVW MEDS BY RX/DR IN RCRD: CPT | Performed by: PODIATRIST

## 2024-10-21 PROCEDURE — 99203 OFFICE O/P NEW LOW 30 MIN: CPT | Performed by: PODIATRIST

## 2024-10-21 PROCEDURE — 1159F MED LIST DOCD IN RCRD: CPT | Performed by: PODIATRIST

## 2024-10-21 RX ORDER — DICLOFENAC SODIUM 75 MG/1
75 TABLET, DELAYED RELEASE ORAL 2 TIMES DAILY
Qty: 60 TABLET | Refills: 1 | Status: SHIPPED | OUTPATIENT
Start: 2024-10-21 | End: 2024-11-20

## 2024-10-21 RX ORDER — METHYLPREDNISOLONE 4 MG
TABLET, DOSE PACK ORAL
Qty: 21 TABLET | Refills: 0 | Status: SHIPPED | OUTPATIENT
Start: 2024-10-21

## 2024-10-21 NOTE — PROGRESS NOTES
[facility]    Today's Date: 10/21/24    Patient Name: Carmina Son  MRN: [id number]  CSN: [id number]  PCP: Jenny Cartwright APRN  Referring Provider: No ref. provider found    SUBJECTIVE     Chief Complaint   Patient presents with    Right Foot - Establish Care, Pain     Heel pain x 3 months  Xray yareli chart      HPI: Carmina Son, a 24 y.o.female, comes to clinic.    New, Established, New Problem:  new    Location: Right heel    Duration:  3 months    Onset:  gradual    Nature:  achy, sharp, shooting    Aggravating factors:  Patient describes morning right heel pain as stabbing, burning, or aching. This pain usually subsides throughout the day, however it returns after periods of rest and sitting, when standing back up on their feet, and again the next morning.      Previous Treatment:  none    Patient denies any fevers, chills, nausea, vomiting, shortness of breath, nor any other constitutional signs nor symptoms.    No other pedal complaints at this time.    Past Medical History:   Diagnosis Date    Abnormal Pap smear of cervix     Anemia     Anxiety     Depression     Encounter for sterilization     Foot pain, right     Hypothyroidism     Migraine     Plantar fasciitis     I dont remember the date but it was recently and the reason i was referred here    Trauma      Past Surgical History:   Procedure Laterality Date    CHOLECYSTECTOMY      COLONOSCOPY      COLONOSCOPY N/A 02/22/2022    Procedure: COLONOSCOPY with random biopsies;  Surgeon: Allegra Capellan MD;  Location: [facility]COPY;  Service: Gastroenterology;  Laterality: N/A;  normal    D & C HYSTEROSCOPY MYOSURE N/A 6/21/2024    Procedure: DILATATION AND CURETTAGE HYSTEROSCOPY WITH MYOSURE;  Surgeon: Precious Mendoza DO;  Location: [facility] OR;  Service: Gynecology;  Laterality: N/A;    ENDOSCOPY      ENDOSCOPY N/A 02/22/2022    Procedure: ESOPHAGOGASTRODUODENOSCOPY with biopsies;  Surgeon: Allegra Capellan  MD Angelica;  Location: [facility] ENDOSCOPY;  Service: Gastroenterology;  Laterality: N/A;  gastritis and HH    HYSTEROSCOPY      SALPINGECTOMY Bilateral 2023    Procedure: SALPINGECTOMY LAPAROSCOPIC BILATERAL;  Surgeon: Precious Mendoza DO;  Location: [facility] OR [facility];  Service: Gynecology;  Laterality: Bilateral;    TONSILLECTOMY      UPPER GASTROINTESTINAL ENDOSCOPY      WISDOM TOOTH EXTRACTION       Family History   Problem Relation Age of Onset    Stroke Mother     Diabetes Mother     Hypertension Mother     Irritable bowel syndrome Mother     Depression Mother     Drug abuse Mother     Miscarriages / Stillbirths Mother     Thyroid disease Mother     Breast cancer Mother         Pt unsure of age of onset    Diabetes Father     Drug abuse Father     Hypertension Father     Mental illness Father     Stroke Father     Asthma Sister     Asthma Sister     Crohn's disease Maternal Grandmother     Arthritis Paternal Grandmother     Hearing loss Paternal Grandfather     Uterine cancer Neg Hx     Ovarian cancer Neg Hx     Colon cancer Neg Hx     Deep vein thrombosis Neg Hx     Pulmonary embolism Neg Hx     Malig Hyperthermia Neg Hx      Social History     Socioeconomic History    Marital status:    Tobacco Use    Smoking status: Former     Current packs/day: 0.00     Average packs/day: 0.3 packs/day for 0.5 years (0.1 ttl pk-yrs)     Types: Cigarettes, Electronic Cigarette     Start date: 2019     Quit date: 2020     Years since quittin.8    Smokeless tobacco: Never    Tobacco comments:     Not really sure what little i smoked counts as tobacco use and i have no idea about the dates   Vaping Use    Vaping status: Former    Substances: Nicotine, last vape couple days ago    Devices: Disposable   Substance and Sexual Activity    Alcohol use: Not Currently     Comment: rarely    Drug use: Yes     Types: Marijuana     Comment: last smoked     Sexual activity: Yes     Partners: Male     Birth  control/protection: Tubal ligation     No Known Allergies  Current Outpatient Medications   Medication Sig Dispense Refill    Euthyrox 50 MCG tablet Take 1 tablet by mouth once daily 90 tablet 0    diclofenac (VOLTAREN) 75 MG EC tablet Take 1 tablet by mouth 2 (Two) Times a Day for 30 days. 60 tablet 1    methylPREDNISolone (MEDROL) 4 MG dose pack Take as directed 21 tablet 0     No current facility-administered medications for this visit.     Review of Systems   Constitutional: Negative.    Musculoskeletal:         Right heel pain   All other systems reviewed and are negative.      OBJECTIVE     Vitals:    10/21/24 1251   BP: 110/73   Pulse: 76   Temp: 98.2 °F (36.8 °C)   SpO2: 97%       PHYSICAL EXAM     Foot/Ankle Exam    GENERAL  Appearance:  appears stated age and obese  Orientation:  AAOx3  Affect:  appropriate  Gait:  unimpaired  Assistance:  independent  Right shoe gear: sandal  Left shoe gear: sandal    VASCULAR     Right Foot Vascularity   Normal vascular exam    Dorsalis pedis:  2+  Posterior tibial:  2+  Skin temperature:  warm  Edema grading:  None  CFT:  < 3 seconds  Pedal hair growth:  Present  Varicosities:  none     Left Foot Vascularity   Normal vascular exam    Dorsalis pedis:  2+  Posterior tibial:  2+  Skin temperature:  warm  Edema grading:  None  CFT:  < 3 seconds  Pedal hair growth:  Present  Varicosities:  none     NEUROLOGIC     Right Foot Neurologic   Normal sensation    Light touch sensation: normal  Vibratory sensation: normal  Hot/Cold sensation: normal     Left Foot Neurologic   Normal sensation    Light touch sensation: normal  Vibratory sensation: normal  Hot/Cold sensation:  normal    MUSCULOSKELETAL     Right Foot Musculoskeletal   Ecchymosis:  none  Tenderness:  plantar fascia tenderness      MUSCLE STRENGTH     Right Foot Muscle Strength   Foot dorsiflexion:  4  Foot plantar flexion:  4  Foot inversion:  4  Foot eversion:  4     Left Foot Muscle Strength   Foot dorsiflexion:   4  Foot plantar flexion:  4  Foot inversion:  4  Foot eversion:  4    RANGE OF MOTION     Right Foot Range of Motion   Foot and ankle ROM within normal limits       Left Foot Range of Motion   Foot and ankle ROM within normal limits      DERMATOLOGIC      Right Foot Dermatologic   Skin  Right foot skin is intact.   Nails comment:  Toenails 1, 2, 3, 4, and 5     Left Foot Dermatologic   Skin  Left foot skin is intact.   Nails comment:  Toenails 1, 2, 3, 4, and 5      RADIOLOGY:    Narrative & Impression   XR CALCANEUS 2+ VW RIGHT     Date of Exam: 7/23/2024 2:03 PM EDT     Indication: right heel pain     Comparison: None available.     Findings:  No evidence of fracture. No traumatic malalignment on this nonweightbearing exam. Joint space appear preserved.     IMPRESSION:  Impression:  No acute osseous abnormality.        Electronically Signed: Flaco Payton MD    7/23/2024 4:48 PM EDT    Workstation ID: [id number]        ASSESSMENT/PLAN     Diagnoses and all orders for this visit:    1. Foot pain, right (Primary)    2. Plantar fasciitis  -     diclofenac (VOLTAREN) 75 MG EC tablet; Take 1 tablet by mouth 2 (Two) Times a Day for 30 days.  Dispense: 60 tablet; Refill: 1  -     methylPREDNISolone (MEDROL) 4 MG dose pack; Take as directed  Dispense: 21 tablet; Refill: 0      Comprehensive lower extremity examination and evaluation was performed.    Discussed findings and treatment plan including risks, benefits, and treatment options with patient in detail. Patient agreed with treatment plan.    Treatment Options discussed:  - no treatment at all  - change in shoegear  - change in activities  - RICE therapy  - arch support  - NSAIDs  - PO steroids  - injectable steroids    Rice Therapy: It is important to treat any injury as soon as possible to help control swelling and increase recovery time. The recognized regimen for immediate treatment of sport injuries includes rest, ice (cold application), compression, and  elevation (RICE). Remove the injured athlete from play, apply ice to the affected area, wrap or compress the injured area with an elastic bandage when appropriate, and elevate the injured area above heart level to reduce swelling.  The patient is to not use ice for longer than 20 minutes at a time, with at least 20 minutes of no ice usage between applications.     Patient instructed use OTC analgesics with dosing per package insert as needed.      The patient states understanding and agreement with this plan.    Discussed proper shoegear for the patient's feet and medical condition.  Patient states understanding and agreement with this plan.    An After Visit Summary was printed and given to the patient at discharge, including (if requested) any available informative/educational handouts regarding diagnosis, treatment, or medications. All questions were answered to patient/family satisfaction. Should symptoms fail to improve or worsen they agree to call or return to clinic or to go to the Emergency Department. Discussed the importance of following up with any needed screening tests/labs/specialist appointments and any requested follow-up recommended by me today. Importance of maintaining follow-up discussed and patient accepts that missed appointments can delay diagnosis and potentially lead to worsening of conditions.    Return in about 3 weeks (around 11/11/2024) for Rx f/u., or sooner if acute issues arise.    This document has been electronically signed by Paolo Mahan DPM on October 21, 2024 13:18 EDT

## 2025-01-20 DIAGNOSIS — E03.9 HYPOTHYROIDISM, UNSPECIFIED TYPE: ICD-10-CM

## 2025-01-20 RX ORDER — LEVOTHYROXINE SODIUM 50 UG/1
50 TABLET ORAL DAILY
Qty: 90 TABLET | Refills: 0 | Status: SHIPPED | OUTPATIENT
Start: 2025-01-20 | End: 2025-01-23 | Stop reason: SDUPTHER

## 2025-01-20 NOTE — TELEPHONE ENCOUNTER
LAST RX: 09/30/2024 #[id number]   LAST OV: 07/23/2024  UPCOMING OV: None      - please call to schedule appt.

## 2025-01-23 ENCOUNTER — TELEMEDICINE (OUTPATIENT)
Dept: FAMILY MEDICINE CLINIC | Facility: CLINIC | Age: 25
End: 2025-01-23
Payer: COMMERCIAL

## 2025-01-23 VITALS — BODY MASS INDEX: 35.44 KG/M2 | HEIGHT: 63 IN | WEIGHT: 200 LBS

## 2025-01-23 DIAGNOSIS — E03.9 HYPOTHYROIDISM, UNSPECIFIED TYPE: ICD-10-CM

## 2025-01-23 DIAGNOSIS — J06.9 UPPER RESPIRATORY TRACT INFECTION, UNSPECIFIED TYPE: Primary | ICD-10-CM

## 2025-01-23 PROCEDURE — 99214 OFFICE O/P EST MOD 30 MIN: CPT | Performed by: NURSE PRACTITIONER

## 2025-01-23 PROCEDURE — 1159F MED LIST DOCD IN RCRD: CPT | Performed by: NURSE PRACTITIONER

## 2025-01-23 PROCEDURE — 1160F RVW MEDS BY RX/DR IN RCRD: CPT | Performed by: NURSE PRACTITIONER

## 2025-01-23 PROCEDURE — 1125F AMNT PAIN NOTED PAIN PRSNT: CPT | Performed by: NURSE PRACTITIONER

## 2025-01-23 RX ORDER — LEVOTHYROXINE SODIUM 50 UG/1
50 TABLET ORAL DAILY
Qty: 90 TABLET | Refills: 1 | Status: SHIPPED | OUTPATIENT
Start: 2025-01-23

## 2025-01-23 NOTE — PROGRESS NOTES
Mode of Visit: Video  Location of patient: -HOME-  Location of provider: +[facility]+  You have chosen to receive care through a telehealth visit.  The patient has signed the video visit consent form.  The visit included audio and video interaction. No technical issues occurred during this visit.    Chief Complaint  Hypothyroidism    COY Son presents to [facility] to follow up on Hypothyroidism. Pt is doing well and needs a refill.     Patient initially scheduled for today's visit as an in office visit, called to cancel/reschedule due to coming down with a cold/URI.  States that several members of her family have come down with this in the last couple of days.  Patient reports mild cough, nasal congestion, drainage, low-grade fever, no shortness of breath.   Patient does not want testing for flu COVID or otherwise, states she does have an at home COVID testing kit that she will use if she feels needed  States she decided not to come in to the office because she did not want to expose everyone to what ever this illness it is        History of Present Illness  Past Medical History:   Diagnosis Date    Abnormal Pap smear of cervix     Anemia     Anxiety     Depression     Encounter for sterilization     Foot pain, right     Hypothyroidism     Migraine     Plantar fasciitis     I dont remember the date but it was recently and the reason i was referred here    Trauma       Family History   Problem Relation Age of Onset    Stroke Mother     Diabetes Mother     Hypertension Mother     Irritable bowel syndrome Mother     Depression Mother     Drug abuse Mother     Miscarriages / Stillbirths Mother     Thyroid disease Mother     Breast cancer Mother         Pt unsure of age of onset    Diabetes Father     Drug abuse Father     Hypertension Father     Mental illness Father     Stroke Father     Asthma Sister     Asthma Sister     Crohn's disease Maternal Grandmother      "Arthritis Paternal Grandmother     Hearing loss Paternal Grandfather     Uterine cancer Neg Hx     Ovarian cancer Neg Hx     Colon cancer Neg Hx     Deep vein thrombosis Neg Hx     Pulmonary embolism Neg Hx     Malig Hyperthermia Neg Hx       Past Surgical History:   Procedure Laterality Date    CHOLECYSTECTOMY      COLONOSCOPY      COLONOSCOPY N/A 02/22/2022    Procedure: COLONOSCOPY with random biopsies;  Surgeon: Allegra Capellan MD;  Location: [facility] END[facility]COPY;  Service: Gastroenterology;  Laterality: N/A;  normal    D & C HYSTEROSCOPY MYOSURE N/A 6/21/2024    Procedure: DILATATION AND CURETTAGE HYSTEROSCOPY WITH MYOSURE;  Surgeon: Precious Mendoza DO;  Location: [facility] MAIN OR;  Service: Gynecology;  Laterality: N/A;    ENDOSCOPY      ENDOSCOPY N/A 02/22/2022    Procedure: ESOPHAGOGASTRODUODENOSCOPY with biopsies;  Surgeon: Allegra Capellan MD;  Location: [facility] ENDOSCOPY;  Service: Gastroenterology;  Laterality: N/A;  gastritis and HH    HYSTEROSCOPY      SALPINGECTOMY Bilateral 04/14/2023    Procedure: SALPINGECTOMY LAPAROSCOPIC BILATERAL;  Surgeon: Precious Mendoza DO;  Location: [facility] OR OSC;  Service: Gynecology;  Laterality: Bilateral;    TONSILLECTOMY      UPPER GASTROINTESTINAL ENDOSCOPY      WISDOM TOOTH EXTRACTION          Current Outpatient Medications:     Euthyrox 50 MCG tablet, Take 1 tablet by mouth Daily., Disp: 90 tablet, Rfl: 1    methylPREDNISolone (MEDROL) 4 MG dose pack, Take as directed (Patient not taking: Reported on 1/23/2025), Disp: 21 tablet, Rfl: 0    OBJECTIVE  Vital Signs:   Ht 160 cm (63\")   Wt 90.7 kg (200 lb)   BMI 35.43 kg/m²    Estimated body mass index is 35.43 kg/m² as calculated from the following:    Height as of this encounter: 160 cm (63\").    Weight as of this encounter: 90.7 kg (200 lb).     Wt Readings from Last 3 Encounters:   01/23/25 90.7 kg (200 lb)   10/21/24 98.4 kg (217 lb)   07/23/24 101 kg (223 lb)     BP Readings from Last 3 Encounters: "   10/21/24 110/73   07/23/24 108/45   07/03/24 108/76       Physical Exam  Constitutional:       General: She is not in acute distress.     Appearance: Normal appearance. She is not ill-appearing.   HENT:      Head: Normocephalic and atraumatic.   Pulmonary:      Effort: Pulmonary effort is normal.      Comments: Occasional cough noted  Neurological:      Mental Status: She is alert and oriented to person, place, and time.   Psychiatric:         Mood and Affect: Mood normal.         Behavior: Behavior normal.         Thought Content: Thought content normal.         Judgment: Judgment normal.          Result Review    CMP          4/4/2024    10:51   CMP   Glucose 75    BUN 12    Creatinine 0.92    EGFR 88.8    Sodium 137    Potassium 4.6    Chloride 101    Calcium 9.3    Total Protein 6.8    Albumin 4.4    Globulin 2.4    Total Bilirubin 0.3    Alkaline Phosphatase 92    AST (SGOT) 26    ALT (SGPT) 30    Albumin/Globulin Ratio 1.8    BUN/Creatinine Ratio 13.0    Anion Gap 11.0      CBC          4/4/2024    10:51 6/21/2024    06:58 7/12/2024    16:10   CBC   WBC 8.93  10.37  9.14    RBC 4.14  4.11  4.24    Hemoglobin 12.3  12.3  12.7    Hematocrit 37.1  37.2  37.6    MCV 89.6  90.5  88.7    MCH 29.7  29.9  30.0    MCHC 33.2  33.1  33.8    RDW 11.9  13.8  13.7    Platelets 282  259  269      Lipid Panel          4/4/2024    10:51   Lipid Panel   Total Cholesterol 232    Triglycerides 218    HDL Cholesterol 42    VLDL Cholesterol 40    LDL Cholesterol  150    LDL/HDL Ratio 3.49      TSH          3/19/2024    15:01   TSH   TSH 3.200        No Images in the past 120 days found..     The above data has been reviewed by NI Alas 01/23/2025 12:37 EST.          Patient Care Team:  Jenny Cartwright APRN as PCP - General (Nurse Practitioner)  Issa Asencio Sr., MD (Inactive) as Consulting Physician (Obstetrics and Gynecology)  Magaly Moser APRN (Psychiatry)            ASSESSMENT & PLAN    Diagnoses  and all orders for this visit:    1. Upper respiratory tract infection, unspecified type (Primary)  Comments:  Stay well-hydrated, OTC Tylenol or Motrin as needed, OTC cough medication as needed, follow-up if needed    2. Hypothyroidism, unspecified type  Overview:  Stable at present, continue current dose of medication    Orders:  -     Euthyrox 50 MCG tablet; Take 1 tablet by mouth Daily.  Dispense: 90 tablet; Refill: 1  -     TSH+Free T4; Future         Tobacco Use: Medium Risk (1/23/2025)    Patient History     Smoking Tobacco Use: Former     Smokeless Tobacco Use: Never     Passive Exposure: Not on file       Follow Up     Return in about 6 months (around 7/23/2025), or if symptoms worsen or fail to improve.        Patient was given instructions and counseling regarding her condition or for health maintenance advice. Please see specific information pulled into the AVS if appropriate.   I have reviewed information obtained and documented by others and I have confirmed the accuracy of this documented note.    NI Alas

## 2025-05-07 ENCOUNTER — TELEPHONE (OUTPATIENT)
Dept: FAMILY MEDICINE CLINIC | Facility: CLINIC | Age: 25
End: 2025-05-07
Payer: COMMERCIAL

## 2025-05-07 NOTE — TELEPHONE ENCOUNTER
Received notice via fax from [facility] that they are unable to get Euthyrox in.     Called pt to see if she wants to try synthroid or change pharmacies. Pt states synthroid gave her headaches. Pt will call other pharmacies and then call back with where she would like Euthroyx sent to.

## 2025-05-14 ENCOUNTER — TELEMEDICINE (OUTPATIENT)
Dept: FAMILY MEDICINE CLINIC | Facility: CLINIC | Age: 25
End: 2025-05-14
Payer: COMMERCIAL

## 2025-05-14 ENCOUNTER — LAB (OUTPATIENT)
Dept: LAB | Facility: HOSPITAL | Age: 25
End: 2025-05-14
Payer: COMMERCIAL

## 2025-05-14 DIAGNOSIS — E03.9 HYPOTHYROIDISM, UNSPECIFIED TYPE: ICD-10-CM

## 2025-05-14 DIAGNOSIS — E03.9 HYPOTHYROIDISM, UNSPECIFIED TYPE: Primary | ICD-10-CM

## 2025-05-14 LAB
T4 FREE SERPL-MCNC: 1.3 NG/DL (ref 0.92–1.68)
TSH SERPL DL<=0.05 MIU/L-ACNC: 1.64 UIU/ML (ref 0.27–4.2)

## 2025-05-14 PROCEDURE — 84439 ASSAY OF FREE THYROXINE: CPT

## 2025-05-14 PROCEDURE — 1159F MED LIST DOCD IN RCRD: CPT | Performed by: NURSE PRACTITIONER

## 2025-05-14 PROCEDURE — 1160F RVW MEDS BY RX/DR IN RCRD: CPT | Performed by: NURSE PRACTITIONER

## 2025-05-14 PROCEDURE — 99214 OFFICE O/P EST MOD 30 MIN: CPT | Performed by: NURSE PRACTITIONER

## 2025-05-14 PROCEDURE — 36415 COLL VENOUS BLD VENIPUNCTURE: CPT

## 2025-05-14 PROCEDURE — 84443 ASSAY THYROID STIM HORMONE: CPT

## 2025-05-14 PROCEDURE — 1125F AMNT PAIN NOTED PAIN PRSNT: CPT | Performed by: NURSE PRACTITIONER

## 2025-05-14 RX ORDER — LEVOTHYROXINE SODIUM 50 UG/1
50 TABLET ORAL
Qty: 30 TABLET | Refills: 1 | Status: SHIPPED | OUTPATIENT
Start: 2025-05-14

## 2025-05-14 NOTE — PROGRESS NOTES
Chief Complaint  F/u thyroid     SUBJECTIVE  Carmina Son presents to [facility]     Patient here today to discuss thyroid.  States that the medication that she has been taking Eurothyrox is no longer being manufactured.  Patient states that she was switched to this specific brand because she felt like the generic levothyroxine caused her to have a headache    History of Present Illness  Past Medical History:   Diagnosis Date    Abnormal Pap smear of cervix     Anemia     Anxiety     Depression     Encounter for sterilization     Foot pain, right     Hypothyroidism     Migraine     Plantar fasciitis     I dont remember the date but it was recently and the reason i was referred here    Trauma       Family History   Problem Relation Age of Onset    Stroke Mother     Diabetes Mother     Hypertension Mother     Irritable bowel syndrome Mother     Depression Mother     Drug abuse Mother     Miscarriages / Stillbirths Mother     Thyroid disease Mother     Breast cancer Mother         Pt unsure of age of onset    Diabetes Father     Drug abuse Father     Hypertension Father     Mental illness Father     Stroke Father     Asthma Sister     Asthma Sister     Crohn's disease Maternal Grandmother     Arthritis Paternal Grandmother     Hearing loss Paternal Grandfather     Uterine cancer Neg Hx     Ovarian cancer Neg Hx     Colon cancer Neg Hx     Deep vein thrombosis Neg Hx     Pulmonary embolism Neg Hx     Malig Hyperthermia Neg Hx       Past Surgical History:   Procedure Laterality Date    CHOLECYSTECTOMY      COLONOSCOPY      COLONOSCOPY N/A 02/22/2022    Procedure: COLONOSCOPY with random biopsies;  Surgeon: Allegra Capellan MD;  Location: [facility] ENDOSCOPY;  Service: Gastroenterology;  Laterality: N/A;  normal    D & C HYSTEROSCOPY MYOSURE N/A 6/21/2024    Procedure: DILATATION AND CURETTAGE HYSTEROSCOPY WITH MYOSURE;  Surgeon: Precious Mendoza DO;  Location: [facility] MAIN OR;   "Service: Gynecology;  Laterality: N/A;    ENDOSCOPY      ENDOSCOPY N/A 02/22/2022    Procedure: ESOPHAGOGASTRODUODENOSCOPY with biopsies;  Surgeon: Allegra Capellan MD;  Location: [facility] ENDOSCOPY;  Service: Gastroenterology;  Laterality: N/A;  gastritis and HH    HYSTEROSCOPY      SALPINGECTOMY Bilateral 04/14/2023    Procedure: SALPINGECTOMY LAPAROSCOPIC BILATERAL;  Surgeon: Precious Mendoza DO;  Location: [facility] OR [facility];  Service: Gynecology;  Laterality: Bilateral;    TONSILLECTOMY      UPPER GASTROINTESTINAL ENDOSCOPY      WISDOM TOOTH EXTRACTION          Current Outpatient Medications:     levothyroxine (Synthroid) 50 MCG tablet, Take 1 tablet by mouth Every Morning., Disp: 30 tablet, Rfl: 1    OBJECTIVE  Vital Signs:   There were no vitals taken for this visit.   Estimated body mass index is 35.43 kg/m² as calculated from the following:    Height as of 1/23/25: 160 cm (63\").    Weight as of 1/23/25: 90.7 kg (200 lb).     Wt Readings from Last 3 Encounters:   01/23/25 90.7 kg (200 lb)   10/21/24 98.4 kg (217 lb)   07/23/24 101 kg (223 lb)     BP Readings from Last 3 Encounters:   10/21/24 110/73   07/23/24 108/45   07/03/24 108/76       Physical Exam  Vitals reviewed.   Constitutional:       Appearance: Normal appearance. She is well-developed.   HENT:      Head: Normocephalic and atraumatic.      Right Ear: External ear normal.      Left Ear: External ear normal.   Eyes:      Conjunctiva/sclera: Conjunctivae normal.      Pupils: Pupils are equal, round, and reactive to light.   Cardiovascular:      Rate and Rhythm: Normal rate and regular rhythm.      Heart sounds: No murmur heard.     No friction rub. No gallop.   Pulmonary:      Effort: Pulmonary effort is normal.      Breath sounds: Normal breath sounds. No wheezing or rhonchi.   Skin:     General: Skin is warm and dry.   Neurological:      Mental Status: She is alert and oriented to person, place, and time.      Cranial Nerves: No cranial nerve " deficit.   Psychiatric:         Mood and Affect: Mood and affect normal.         Behavior: Behavior normal.         Thought Content: Thought content normal.         Judgment: Judgment normal.          Result Review      CBC          6/21/2024    06:58 7/12/2024    16:10   CBC   WBC 10.37  9.14    RBC 4.11  4.24    Hemoglobin 12.3  12.7    Hematocrit 37.2  37.6    MCV 90.5  88.7    MCH 29.9  30.0    MCHC 33.1  33.8    RDW 13.8  13.7    Platelets 259  269              No Images in the past 120 days found..     The above data has been reviewed by NI Alas 05/14/2025 09:03 EDT.          Patient Care Team:  Jenny Cartwright APRN as PCP - General (Nurse Practitioner)  Issa Asencio Sr., MD (Inactive) as Consulting Physician (Obstetrics and Gynecology)  Magaly Moser APRN (Psychiatry)         ASSESSMENT & PLAN    Diagnoses and all orders for this visit:    1. Hypothyroidism, unspecified type (Primary)  Assessment & Plan:  Patient overdue for labs, did not have them checked her lab orders placed in January, last thyroid levels were normal but were over 1 year ago, March 2024.  Patients specific brand, Euthyrox no longer being manufactured, patient stay she was switched to this brand when complaining of headaches with generic levothyroxine.  Discussed this is not a common side effect complaint, could have been due to a component of what ever the specific generic brand was.  After discussion we have agreed to retrial levothyroxine.  Prescription sent, patient will check labs now and again in 8 weeks    Orders:  -     levothyroxine (Synthroid) 50 MCG tablet; Take 1 tablet by mouth Every Morning.  Dispense: 30 tablet; Refill: 1  -     TSH+Free T4; Future         Tobacco Use: Medium Risk (5/14/2025)    Patient History     Smoking Tobacco Use: Former     Smokeless Tobacco Use: Never     Passive Exposure: Not on file       Follow Up     Return in about 2 months (around 7/14/2025), or if symptoms worsen or  fail to improve.        Patient was given instructions and counseling regarding her condition or for health maintenance advice. Please see specific information pulled into the AVS if appropriate.   I have reviewed information obtained and documented by others and I have confirmed the accuracy of this documented note.    Jenny Cartwright APRN

## 2025-05-14 NOTE — ASSESSMENT & PLAN NOTE
Patient overdue for labs, did not have them checked her lab orders placed in January, last thyroid levels were normal but were over 1 year ago, March 2024.  Patients specific brand, Euthyrox no longer being manufactured, patient stay she was switched to this brand when complaining of headaches with generic levothyroxine.  Discussed this is not a common side effect complaint, could have been due to a component of what ever the specific generic brand was.  After discussion we have agreed to retrial levothyroxine.  Prescription sent, patient will check labs now and again in 8 weeks

## 2025-05-15 ENCOUNTER — RESULTS FOLLOW-UP (OUTPATIENT)
Dept: FAMILY MEDICINE CLINIC | Facility: CLINIC | Age: 25
End: 2025-05-15
Payer: COMMERCIAL

## 2025-05-15 DIAGNOSIS — E03.9 HYPOTHYROIDISM, UNSPECIFIED TYPE: Primary | ICD-10-CM

## 2025-05-21 ENCOUNTER — TELEPHONE (OUTPATIENT)
Dept: FAMILY MEDICINE CLINIC | Facility: CLINIC | Age: 25
End: 2025-05-21
Payer: COMMERCIAL

## 2025-05-21 DIAGNOSIS — E03.9 HYPOTHYROIDISM, UNSPECIFIED TYPE: ICD-10-CM

## 2025-05-21 RX ORDER — LEVOTHYROXINE SODIUM 50 UG/1
50 TABLET ORAL
Qty: 24 TABLET | Refills: 1 | Status: SHIPPED | OUTPATIENT
Start: 2025-05-21

## 2025-07-07 ENCOUNTER — OFFICE VISIT (OUTPATIENT)
Dept: FAMILY MEDICINE CLINIC | Facility: CLINIC | Age: 25
End: 2025-07-07
Payer: COMMERCIAL

## 2025-07-07 ENCOUNTER — LAB (OUTPATIENT)
Dept: LAB | Facility: HOSPITAL | Age: 25
End: 2025-07-07
Payer: COMMERCIAL

## 2025-07-07 VITALS
HEIGHT: 63 IN | BODY MASS INDEX: 34.91 KG/M2 | HEART RATE: 67 BPM | OXYGEN SATURATION: 98 % | SYSTOLIC BLOOD PRESSURE: 129 MMHG | DIASTOLIC BLOOD PRESSURE: 64 MMHG | WEIGHT: 197 LBS

## 2025-07-07 DIAGNOSIS — R53.83 FATIGUE, UNSPECIFIED TYPE: Primary | ICD-10-CM

## 2025-07-07 DIAGNOSIS — R11.0 NAUSEA: ICD-10-CM

## 2025-07-07 DIAGNOSIS — E03.9 HYPOTHYROIDISM, UNSPECIFIED TYPE: ICD-10-CM

## 2025-07-07 DIAGNOSIS — R53.83 FATIGUE, UNSPECIFIED TYPE: ICD-10-CM

## 2025-07-07 PROBLEM — Z30.2 ENCOUNTER FOR STERILIZATION: Status: RESOLVED | Noted: 2023-04-06 | Resolved: 2025-07-07

## 2025-07-07 LAB
ALBUMIN SERPL-MCNC: 5 G/DL (ref 3.5–5.2)
ALBUMIN/GLOB SERPL: 2.1 G/DL
ALP SERPL-CCNC: 82 U/L (ref 39–117)
ALT SERPL W P-5'-P-CCNC: 20 U/L (ref 1–33)
ANION GAP SERPL CALCULATED.3IONS-SCNC: 11.6 MMOL/L (ref 5–15)
AST SERPL-CCNC: 25 U/L (ref 1–32)
BASOPHILS # BLD AUTO: 0.05 10*3/MM3 (ref 0–0.2)
BASOPHILS NFR BLD AUTO: 0.6 % (ref 0–1.5)
BILIRUB SERPL-MCNC: <0.2 MG/DL (ref 0–1.2)
BUN SERPL-MCNC: 12 MG/DL (ref 6–20)
BUN/CREAT SERPL: 15 (ref 7–25)
CALCIUM SPEC-SCNC: 9.9 MG/DL (ref 8.6–10.5)
CHLORIDE SERPL-SCNC: 103 MMOL/L (ref 98–107)
CO2 SERPL-SCNC: 24.4 MMOL/L (ref 22–29)
CREAT SERPL-MCNC: 0.8 MG/DL (ref 0.57–1)
DEPRECATED RDW RBC AUTO: 41.8 FL (ref 37–54)
EGFRCR SERPLBLD CKD-EPI 2021: 103.7 ML/MIN/1.73
EOSINOPHIL # BLD AUTO: 0.27 10*3/MM3 (ref 0–0.4)
EOSINOPHIL NFR BLD AUTO: 3 % (ref 0.3–6.2)
ERYTHROCYTE [DISTWIDTH] IN BLOOD BY AUTOMATED COUNT: 12.6 % (ref 12.3–15.4)
FERRITIN SERPL-MCNC: 38.6 NG/ML (ref 13–150)
FOLATE SERPL-MCNC: 11.4 NG/ML (ref 4.78–24.2)
GLOBULIN UR ELPH-MCNC: 2.4 GM/DL
GLUCOSE SERPL-MCNC: 81 MG/DL (ref 65–99)
HCT VFR BLD AUTO: 39.1 % (ref 34–46.6)
HGB BLD-MCNC: 13.4 G/DL (ref 12–15.9)
IMM GRANULOCYTES # BLD AUTO: 0.02 10*3/MM3 (ref 0–0.05)
IMM GRANULOCYTES NFR BLD AUTO: 0.2 % (ref 0–0.5)
IRON 24H UR-MRATE: 48 MCG/DL (ref 37–145)
IRON SATN MFR SERPL: 10 % (ref 20–50)
LYMPHOCYTES # BLD AUTO: 2.67 10*3/MM3 (ref 0.7–3.1)
LYMPHOCYTES NFR BLD AUTO: 29.8 % (ref 19.6–45.3)
MCH RBC QN AUTO: 31.4 PG (ref 26.6–33)
MCHC RBC AUTO-ENTMCNC: 34.3 G/DL (ref 31.5–35.7)
MCV RBC AUTO: 91.6 FL (ref 79–97)
MONOCYTES # BLD AUTO: 0.47 10*3/MM3 (ref 0.1–0.9)
MONOCYTES NFR BLD AUTO: 5.2 % (ref 5–12)
NEUTROPHILS NFR BLD AUTO: 5.49 10*3/MM3 (ref 1.7–7)
NEUTROPHILS NFR BLD AUTO: 61.2 % (ref 42.7–76)
NRBC BLD AUTO-RTO: 0 /100 WBC (ref 0–0.2)
PLATELET # BLD AUTO: 257 10*3/MM3 (ref 140–450)
PMV BLD AUTO: 10 FL (ref 6–12)
POTASSIUM SERPL-SCNC: 4.3 MMOL/L (ref 3.5–5.2)
PROT SERPL-MCNC: 7.4 G/DL (ref 6–8.5)
RBC # BLD AUTO: 4.27 10*6/MM3 (ref 3.77–5.28)
SODIUM SERPL-SCNC: 139 MMOL/L (ref 136–145)
T4 FREE SERPL-MCNC: 1.27 NG/DL (ref 0.92–1.68)
TIBC SERPL-MCNC: 471 MCG/DL (ref 298–536)
TRANSFERRIN SERPL-MCNC: 316 MG/DL (ref 200–360)
TSH SERPL DL<=0.05 MIU/L-ACNC: 2.97 UIU/ML (ref 0.27–4.2)
VIT B12 BLD-MCNC: 615 PG/ML (ref 211–946)
WBC NRBC COR # BLD AUTO: 8.97 10*3/MM3 (ref 3.4–10.8)

## 2025-07-07 PROCEDURE — 80053 COMPREHEN METABOLIC PANEL: CPT

## 2025-07-07 PROCEDURE — 99214 OFFICE O/P EST MOD 30 MIN: CPT | Performed by: NURSE PRACTITIONER

## 2025-07-07 PROCEDURE — 83540 ASSAY OF IRON: CPT

## 2025-07-07 PROCEDURE — 36415 COLL VENOUS BLD VENIPUNCTURE: CPT

## 2025-07-07 PROCEDURE — 85025 COMPLETE CBC W/AUTO DIFF WBC: CPT

## 2025-07-07 PROCEDURE — 84443 ASSAY THYROID STIM HORMONE: CPT

## 2025-07-07 PROCEDURE — 84702 CHORIONIC GONADOTROPIN TEST: CPT

## 2025-07-07 PROCEDURE — 1159F MED LIST DOCD IN RCRD: CPT | Performed by: NURSE PRACTITIONER

## 2025-07-07 PROCEDURE — 1125F AMNT PAIN NOTED PAIN PRSNT: CPT | Performed by: NURSE PRACTITIONER

## 2025-07-07 PROCEDURE — 1160F RVW MEDS BY RX/DR IN RCRD: CPT | Performed by: NURSE PRACTITIONER

## 2025-07-07 PROCEDURE — 82746 ASSAY OF FOLIC ACID SERUM: CPT

## 2025-07-07 PROCEDURE — 84466 ASSAY OF TRANSFERRIN: CPT

## 2025-07-07 PROCEDURE — 84439 ASSAY OF FREE THYROXINE: CPT

## 2025-07-07 PROCEDURE — 82728 ASSAY OF FERRITIN: CPT

## 2025-07-07 PROCEDURE — 82607 VITAMIN B-12: CPT

## 2025-07-07 NOTE — ASSESSMENT & PLAN NOTE
Patient tolerating levothyroxine fairly well, although she is having a lot of fatigue, discussed could be due to over or under replacement, we will recheck labs today

## 2025-07-07 NOTE — PROGRESS NOTES
Chief Complaint  Nausea, Fatigue, and Hypothyroidism    COY Son presents to [facility] to follow up on levothyroxine after switching from Eurthyrox. Pt reports her headaches are better and she's lost some weight but is experiencing fatigue, and has had a few random bouts of nausea.  No abdominal pain nausea seems to be unrelated to anything in particular.  Patient notes that the fatigue has been fairly bothersome.  States that she does have a history of iron deficiency but has not had an issue with it for some time that she is aware of     History of Present Illness  Past Medical History:   Diagnosis Date    Abnormal Pap smear of cervix     Anemia     Anxiety     Depression     Encounter for sterilization     Foot pain, right     Hypothyroidism     Migraine     Plantar fasciitis     I dont remember the date but it was recently and the reason i was referred here    Trauma       Family History   Problem Relation Age of Onset    Stroke Mother     Diabetes Mother     Hypertension Mother     Irritable bowel syndrome Mother     Depression Mother     Drug abuse Mother     Miscarriages / Stillbirths Mother     Thyroid disease Mother     Breast cancer Mother         Pt unsure of age of onset    Diabetes Father     Drug abuse Father     Hypertension Father     Mental illness Father     Stroke Father     Asthma Sister     Asthma Sister     Crohn's disease Maternal Grandmother     Arthritis Paternal Grandmother     Hearing loss Paternal Grandfather     Uterine cancer Neg Hx     Ovarian cancer Neg Hx     Colon cancer Neg Hx     Deep vein thrombosis Neg Hx     Pulmonary embolism Neg Hx     Malig Hyperthermia Neg Hx       Past Surgical History:   Procedure Laterality Date    CHOLECYSTECTOMY      COLONOSCOPY      COLONOSCOPY N/A 02/22/2022    Procedure: COLONOSCOPY with random biopsies;  Surgeon: Allegra Capellan MD;  Location: [facility] ENDOSCOPY;  Service:  "Gastroenterology;  Laterality: N/A;  normal    D & C HYSTEROSCOPY MYOSURE N/A 6/21/2024    Procedure: DILATATION AND CURETTAGE HYSTEROSCOPY WITH MYOSURE;  Surgeon: Precious Mendoza DO;  [facility];  Service: Gynecology;  Laterality: N/A;    ENDOSCOPY      ENDOSCOPY N/A 02/22/2022    Procedure: ESOPHAGOGASTRODUODENOSCOPY with biopsies;  Surgeon: Allegra Capellan MD;  Location: [facility];  Service: Gastroenterology;  Laterality: N/A;  gastritis and HH    HYSTEROSCOPY      SALPINGECTOMY Bilateral 04/14/2023    Procedure: SALPINGECTOMY LAPAROSCOPIC BILATERAL;  Surgeon: Precious Mendoza DO;  Location: [facility];  Service: Gynecology;  Laterality: Bilateral;    TONSILLECTOMY      UPPER GASTROINTESTINAL ENDOSCOPY      WISDOM TOOTH EXTRACTION          Current Outpatient Medications:     levothyroxine (Synthroid) 50 MCG tablet, Take 1 tablet by mouth Every Morning., Disp: 24 tablet, Rfl: 1    OBJECTIVE  Vital Signs:   /64   Pulse 67   Ht 160 cm (63\")   Wt 89.4 kg (197 lb)   SpO2 98%   BMI 34.90 kg/m²    Estimated body mass index is 34.9 kg/m² as calculated from the following:    Height as of this encounter: 160 cm (63\").    Weight as of this encounter: 89.4 kg (197 lb).     Wt Readings from Last 3 Encounters:   07/07/25 89.4 kg (197 lb)   01/23/25 90.7 kg (200 lb)   10/21/24 98.4 kg (217 lb)     BP Readings from Last 3 Encounters:   07/07/25 129/64   10/21/24 110/73   07/23/24 108/45       Physical Exam  Vitals reviewed.   Constitutional:       Appearance: Normal appearance. She is well-developed.   HENT:      Head: Normocephalic and atraumatic.      Right Ear: External ear normal.      Left Ear: External ear normal.   Eyes:      Conjunctiva/sclera: Conjunctivae normal.      Pupils: Pupils are equal, round, and reactive to light.   Cardiovascular:      Rate and Rhythm: Normal rate and regular rhythm.      Heart sounds: No murmur heard.     No friction rub. No gallop.   Pulmonary:      " Effort: Pulmonary effort is normal.      Breath sounds: Normal breath sounds. No wheezing or rhonchi.   Abdominal:      General: There is no distension.   Skin:     General: Skin is warm and dry.   Neurological:      Mental Status: She is alert and oriented to person, place, and time.      Cranial Nerves: No cranial nerve deficit.   Psychiatric:         Mood and Affect: Mood and affect normal.         Behavior: Behavior normal.         Thought Content: Thought content normal.         Judgment: Judgment normal.          Result Review          TSH          5/14/2025    13:16   TSH   TSH 1.640              No Images in the past 120 days found..     The above data has been reviewed by NI Alas 07/07/2025 11:46 EDT.          Patient Care Team:  Jenny Cartwright APRN as PCP - General (Nurse Practitioner)  Issa Asencio Sr., MD (Inactive) as Consulting Physician (Obstetrics and Gynecology)  Magaly Moser APRN (Psychiatry)      ASSESSMENT & PLAN    Diagnoses and all orders for this visit:    1. Fatigue, unspecified type (Primary)  -     CBC & Differential; Future  -     Comprehensive Metabolic Panel; Future  -     Vitamin B12; Future  -     Folate; Future  -     Iron Profile w/o Ferritin; Future  -     Ferritin; Future  -     HCG, B-subunit, Quantitative; Future    2. Nausea  -     CBC & Differential; Future  -     Comprehensive Metabolic Panel; Future  -     HCG, B-subunit, Quantitative; Future    3. Hypothyroidism, unspecified type  Comments:  Lab orders already in place for thyroid  Assessment & Plan:  Patient tolerating levothyroxine fairly well, although she is having a lot of fatigue, discussed could be due to over or under replacement, we will recheck labs today           Tobacco Use: Medium Risk (7/7/2025)    Patient History     Smoking Tobacco Use: Former     Smokeless Tobacco Use: Never     Passive Exposure: Not on file       Follow Up     Return if symptoms worsen or fail to  improve.        Patient was given instructions and counseling regarding her condition or for health maintenance advice. Please see specific information pulled into the AVS if appropriate.   I have reviewed information obtained and documented by others and I have confirmed the accuracy of this documented note.    NI Alas

## 2025-07-08 ENCOUNTER — RESULTS FOLLOW-UP (OUTPATIENT)
Dept: FAMILY MEDICINE CLINIC | Facility: CLINIC | Age: 25
End: 2025-07-08
Payer: COMMERCIAL

## 2025-07-08 DIAGNOSIS — E61.1 LOW IRON: ICD-10-CM

## 2025-07-08 DIAGNOSIS — R53.83 FATIGUE, UNSPECIFIED TYPE: Primary | ICD-10-CM

## 2025-07-09 LAB — HCG INTACT+B SERPL-ACNC: <1 MIU/ML

## 2025-08-06 ENCOUNTER — OFFICE VISIT (OUTPATIENT)
Dept: FAMILY MEDICINE CLINIC | Facility: CLINIC | Age: 25
End: 2025-08-06
Payer: COMMERCIAL

## 2025-08-06 ENCOUNTER — HOSPITAL ENCOUNTER (OUTPATIENT)
Dept: GENERAL RADIOLOGY | Facility: HOSPITAL | Age: 25
Discharge: HOME | End: 2025-08-06
Admitting: NURSE PRACTITIONER
Payer: COMMERCIAL

## 2025-08-06 VITALS
OXYGEN SATURATION: 98 % | SYSTOLIC BLOOD PRESSURE: 115 MMHG | HEART RATE: 66 BPM | DIASTOLIC BLOOD PRESSURE: 48 MMHG | BODY MASS INDEX: 35.4 KG/M2 | WEIGHT: 199.8 LBS | HEIGHT: 63 IN | TEMPERATURE: 97.8 F

## 2025-08-06 DIAGNOSIS — M54.50 LOW BACK PAIN, UNSPECIFIED BACK PAIN LATERALITY, UNSPECIFIED CHRONICITY, UNSPECIFIED WHETHER SCIATICA PRESENT: Primary | ICD-10-CM

## 2025-08-06 DIAGNOSIS — M54.50 LOW BACK PAIN, UNSPECIFIED BACK PAIN LATERALITY, UNSPECIFIED CHRONICITY, UNSPECIFIED WHETHER SCIATICA PRESENT: ICD-10-CM

## 2025-08-06 PROCEDURE — 1159F MED LIST DOCD IN RCRD: CPT | Performed by: NURSE PRACTITIONER

## 2025-08-06 PROCEDURE — 1125F AMNT PAIN NOTED PAIN PRSNT: CPT | Performed by: NURSE PRACTITIONER

## 2025-08-06 PROCEDURE — 1160F RVW MEDS BY RX/DR IN RCRD: CPT | Performed by: NURSE PRACTITIONER

## 2025-08-06 PROCEDURE — 99214 OFFICE O/P EST MOD 30 MIN: CPT | Performed by: NURSE PRACTITIONER

## 2025-08-06 PROCEDURE — 72100 X-RAY EXAM L-S SPINE 2/3 VWS: CPT

## 2025-08-06 RX ORDER — METHOCARBAMOL 500 MG/1
500 TABLET, FILM COATED ORAL 3 TIMES DAILY PRN
Qty: 30 TABLET | Refills: 0 | Status: SHIPPED | OUTPATIENT
Start: 2025-08-06

## 2025-08-06 RX ORDER — PRENATAL VIT NO.126/IRON/FOLIC 28MG-0.8MG
1 TABLET ORAL DAILY
COMMUNITY

## 2025-08-06 RX ORDER — IBUPROFEN 800 MG/1
1 TABLET, FILM COATED ORAL 3 TIMES DAILY
COMMUNITY
Start: 2025-08-03

## 2025-08-06 RX ORDER — FERROUS SULFATE 325(65) MG
325 TABLET ORAL 3 TIMES WEEKLY
COMMUNITY

## 2025-08-06 RX ORDER — METHOCARBAMOL 500 MG/1
1 TABLET, FILM COATED ORAL EVERY 12 HOURS SCHEDULED
COMMUNITY
Start: 2025-08-03 | End: 2025-08-06 | Stop reason: SDUPTHER

## 2025-08-06 RX ORDER — METHYLPREDNISOLONE 4 MG/1
TABLET ORAL
Qty: 1 EACH | Refills: 0 | Status: SHIPPED | OUTPATIENT
Start: 2025-08-06

## 2025-08-11 DIAGNOSIS — E03.9 HYPOTHYROIDISM, UNSPECIFIED TYPE: ICD-10-CM

## 2025-08-12 RX ORDER — LEVOTHYROXINE SODIUM 50 UG/1
50 TABLET ORAL EVERY MORNING
Qty: 90 TABLET | Refills: 1 | Status: SHIPPED | OUTPATIENT
Start: 2025-08-12

## 2025-08-19 ENCOUNTER — OFFICE VISIT (OUTPATIENT)
Dept: OBSTETRICS AND GYNECOLOGY | Age: 25
End: 2025-08-19
Payer: COMMERCIAL

## 2025-08-19 VITALS
DIASTOLIC BLOOD PRESSURE: 81 MMHG | WEIGHT: 201 LBS | SYSTOLIC BLOOD PRESSURE: 142 MMHG | BODY MASS INDEX: 35.61 KG/M2 | HEART RATE: 69 BPM | HEIGHT: 63 IN

## 2025-08-19 DIAGNOSIS — Z01.419 WELL WOMAN EXAM: Primary | ICD-10-CM

## 2025-08-19 DIAGNOSIS — R10.2 PELVIC PAIN: ICD-10-CM

## 2025-08-19 PROCEDURE — G0123 SCREEN CERV/VAG THIN LAYER: HCPCS | Performed by: OBSTETRICS & GYNECOLOGY

## 2025-08-22 LAB
CONV .: NORMAL
CYTOLOGIST CVX/VAG CYTO: NORMAL
CYTOLOGY CVX/VAG DOC CYTO: NORMAL
CYTOLOGY CVX/VAG DOC THIN PREP: NORMAL
DX ICD CODE: NORMAL
OTHER STN SPEC: NORMAL
SERVICE CMNT-IMP: NORMAL
STAT OF ADQ CVX/VAG CYTO-IMP: NORMAL

## (undated) DEVICE — SOL IRRG H2O PL/BG 1000ML STRL

## (undated) DEVICE — APPL CHLORAPREP HI/LITE 26ML ORNG

## (undated) DEVICE — TROCAR: Brand: KII® SLEEVE

## (undated) DEVICE — KT ANTI FOG W/FLD AND SPNG

## (undated) DEVICE — LAPAROSCOPIC TROCAR SLEEVE/SINGLE USE: Brand: KII® OPTICAL ACCESS SYSTEM

## (undated) DEVICE — TROCARS: Brand: KII® OPTICAL ACCESS SYSTEM

## (undated) DEVICE — SOL NACL 0.9PCT 1000ML

## (undated) DEVICE — NDL HYPO ECLPS SFTY 22G 1 1/2IN

## (undated) DEVICE — CATH URETH INTRMIT ALLPURP LTX 16F RED

## (undated) DEVICE — SHEET,DRAPE,70X85,STERILE: Brand: MEDLINE

## (undated) DEVICE — 1000ML,PRESSURE INFUSER W/STOPCOCK: Brand: MEDLINE

## (undated) DEVICE — LITHOTOMY-YELLOW FINS: Brand: MEDLINE INDUSTRIES, INC.

## (undated) DEVICE — GLV SURG BIOGEL LTX PF 6 1/2

## (undated) DEVICE — MARYLAND JAW LAPAROSCOPIC SEALER/DIVIDER COATED: Brand: LIGASURE

## (undated) DEVICE — GLV SURG SENSICARE SLT PF LF 6.5 STRL

## (undated) DEVICE — INTENDED FOR TISSUE SEPARATION, AND OTHER PROCEDURES THAT REQUIRE A SHARP SURGICAL BLADE TO PUNCTURE OR CUT.: Brand: BARD-PARKER ® SAFETYLOCK CARBON RIB-BACK BLADES

## (undated) DEVICE — DEV TISS REMOV MYOSURE/LITE HYSTEROSCP

## (undated) DEVICE — ADHS SKIN SURG TISS VISC PREMIERPRO EXOFIN HI/VISC FAST/DRY

## (undated) DEVICE — 40418 TRENDELENBURG ONE-STEP ARM PROTECTORS LARGE (1 PAIR): Brand: 40418 TRENDELENBURG ONE-STEP ARM PROTECTORS LARGE (1 PAIR)

## (undated) DEVICE — SEAL HYSTERSCOPE/OUTFLOW CHANNEL MYOSURE

## (undated) DEVICE — LITHOTOMY-SLINGS: Brand: MEDLINE INDUSTRIES, INC.

## (undated) DEVICE — Device: Brand: DEFENDO AIR/WATER/SUCTION AND BIOPSY VALVE

## (undated) DEVICE — PREP TRAY WITH CHG: Brand: MEDLINE INDUSTRIES, INC.

## (undated) DEVICE — SLV SCD KN/LEN ADJ EXPRSS BLENDED MD 1P/U

## (undated) DEVICE — KT PROC HYSTSCPY TB ST

## (undated) DEVICE — ANTIBACTERIAL UNDYED BRAIDED (POLYGLACTIN 910), SYNTHETIC ABSORBABLE SUTURE: Brand: COATED VICRYL

## (undated) DEVICE — TBG INSUFFLATION W/CPC CONNEC: Brand: MEDLINE INDUSTRIES, INC.

## (undated) DEVICE — EGD OR ERCP KIT: Brand: MEDLINE INDUSTRIES, INC.

## (undated) DEVICE — DUAL LUMEN STOMACH TUBE,ANTI-REFLUX VALVE: Brand: SALEM SUMP

## (undated) DEVICE — COLON KIT: Brand: MEDLINE INDUSTRIES, INC.

## (undated) DEVICE — SKIN PREP TRAY W/CHG: Brand: MEDLINE INDUSTRIES, INC.

## (undated) DEVICE — SYR LL TP 10ML STRL

## (undated) DEVICE — SINGLE-USE BIOPSY FORCEPS: Brand: RADIAL JAW 4

## (undated) DEVICE — 1200 DOUBLE MAGNET NEEDLE COUNTER,BLACK: Brand: DEVON